# Patient Record
Sex: FEMALE | Race: WHITE | NOT HISPANIC OR LATINO | Employment: FULL TIME | URBAN - METROPOLITAN AREA
[De-identification: names, ages, dates, MRNs, and addresses within clinical notes are randomized per-mention and may not be internally consistent; named-entity substitution may affect disease eponyms.]

---

## 2018-01-12 NOTE — MISCELLANEOUS
Provider Comments  Provider Comments:   PT NO SHOW FOR APPT TODAY      Signatures   Electronically signed by : Ana Maria Belle, ; Feb 1 2016  4:46PM EST                       (Author)    Electronically signed by :  Dacia Williamson, ; Feb 2 2016  1:52PM EST                       (Author)    Electronically signed by : DREW Vazquez ; Feb 7 2016  5:44PM EST                       (Review)

## 2018-01-13 NOTE — MISCELLANEOUS
Message  Return to work or school:   Reid Hitchcock is under my professional care  She was seen in my office on 2/29/16   She is able to return to work on   3/1/16            Future Appointments    Signatures   Electronically signed by : Beth Stover DO; Feb 29 2016  2:17PM EST                       (Author)

## 2021-04-15 ENCOUNTER — HOSPITAL ENCOUNTER (EMERGENCY)
Facility: HOSPITAL | Age: 41
Discharge: HOME | End: 2021-04-15
Attending: EMERGENCY MEDICINE | Admitting: EMERGENCY MEDICINE
Payer: OTHER MISCELLANEOUS

## 2021-04-15 VITALS
HEIGHT: 67 IN | BODY MASS INDEX: 31.39 KG/M2 | RESPIRATION RATE: 17 BRPM | SYSTOLIC BLOOD PRESSURE: 138 MMHG | HEART RATE: 73 BPM | WEIGHT: 200 LBS | TEMPERATURE: 98.2 F | OXYGEN SATURATION: 99 % | DIASTOLIC BLOOD PRESSURE: 92 MMHG

## 2021-04-15 DIAGNOSIS — S49.91XA RIGHT SHOULDER INJURY: Primary | ICD-10-CM

## 2021-04-15 PROCEDURE — 99283 EMERGENCY DEPT VISIT LOW MDM: CPT

## 2021-04-15 PROCEDURE — 99284 EMERGENCY DEPT VISIT MOD MDM: CPT | Performed by: EMERGENCY MEDICINE

## 2021-04-15 RX ORDER — ACETAMINOPHEN 325 MG/1
975 TABLET ORAL ONCE
Status: COMPLETED | OUTPATIENT
Start: 2021-04-15 | End: 2021-04-15

## 2021-04-15 RX ORDER — LIDOCAINE 50 MG/G
1 PATCH TOPICAL ONCE
Status: DISCONTINUED | OUTPATIENT
Start: 2021-04-15 | End: 2021-04-15 | Stop reason: HOSPADM

## 2021-04-15 RX ADMIN — ACETAMINOPHEN 975 MG: 325 TABLET ORAL at 17:35

## 2021-04-15 RX ADMIN — LIDOCAINE 1 PATCH: 50 PATCH TOPICAL at 17:35

## 2021-04-15 NOTE — ED PROVIDER NOTES
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  Patient rolled away from her and caught her right arm underneath the patient  Patient had pain within the right shoulder located on the medial aspect of the right humerus  Pain is sharp  Worse with movement  Patient has full range of motion  Denies numbness, tingling, weakness  No other trauma to the shoulder  None       No past medical history on file  No past surgical history on file  No family history on file  I have reviewed and agree with the history as documented  E-Cigarette/Vaping    E-Cigarette Use Never User      E-Cigarette/Vaping Substances     Social History     Tobacco Use    Smoking status: Former Smoker    Smokeless tobacco: Never Used   Substance Use Topics    Alcohol use: Not Currently     Frequency: Never    Drug use: Not Currently        Review of Systems   Musculoskeletal: Positive for arthralgias and myalgias  All other systems reviewed and are negative  Physical Exam  ED Triage Vitals [04/15/21 1618]   Temperature Pulse Respirations Blood Pressure SpO2   98 2 °F (36 8 °C) 73 17 138/92 99 %      Temp Source Heart Rate Source Patient Position - Orthostatic VS BP Location FiO2 (%)   Oral Monitor Sitting Right arm --      Pain Score       7             Orthostatic Vital Signs  Vitals:    04/15/21 1618   BP: 138/92   Pulse: 73   Patient Position - Orthostatic VS: Sitting       Physical Exam  Vitals signs and nursing note reviewed  Constitutional:       General: She is not in acute distress  Appearance: Normal appearance  She is not ill-appearing  HENT:      Head: Normocephalic and atraumatic        Right Ear: External ear normal       Left Ear: External ear normal       Nose: Nose normal       Mouth/Throat:      Mouth: Mucous membranes are moist    Eyes:      General:         Right eye: No discharge  Left eye: No discharge  Conjunctiva/sclera: Conjunctivae normal    Neck:      Musculoskeletal: Normal range of motion  Cardiovascular:      Rate and Rhythm: Normal rate and regular rhythm  Pulses: Normal pulses  Heart sounds: No murmur  Pulmonary:      Effort: Pulmonary effort is normal       Breath sounds: Normal breath sounds  Abdominal:      General: Abdomen is flat  There is no distension  Tenderness: There is no abdominal tenderness  Musculoskeletal: Normal range of motion  General: Tenderness (Mild tenderness to palpation within the medial head of the humerus  On the right) present  Comments: Negative empty can test   Pain with supination of the right arm hole that approximately 90° of flexion 45° of external rotation  Pain primarily in the biceps tendon   Skin:     General: Skin is warm  Capillary Refill: Capillary refill takes less than 2 seconds  Findings: No rash  Neurological:      General: No focal deficit present  Mental Status: She is alert  Mental status is at baseline  Psychiatric:         Mood and Affect: Mood normal          Behavior: Behavior normal          ED Medications  Medications   lidocaine (LIDODERM) 5 % patch 1 patch (1 patch Topical Medication Applied 4/15/21 1735)   acetaminophen (TYLENOL) tablet 975 mg (975 mg Oral Given 4/15/21 1735)       Diagnostic Studies  Results Reviewed     None                 No orders to display         Procedures  Procedures      ED Course                                       MDM  Number of Diagnoses or Management Options  Diagnosis management comments: 39-year-old female presents with shoulder pain  Not fracture  Not a dislocation  Probable muscular injury versus tendinous injury  Will discharge home    Patient to follow-up with physical therapy and Occupational Medicine  Return precautions given  Risk of Complications, Morbidity, and/or Mortality  Presenting problems: minimal  Diagnostic procedures: minimal  Management options: minimal        Disposition  Final diagnoses:   Right shoulder injury     Time reflects when diagnosis was documented in both MDM as applicable and the Disposition within this note     Time User Action Codes Description Comment    4/15/2021  5:55 PM Davian Hobson Add [S49 91XA] Right shoulder injury       ED Disposition     ED Disposition Condition Date/Time Comment    Discharge Stable Thu Apr 15, 2021  5:55 PM Omayra Alfaro discharge to home/self care  Follow-up Information     Follow up With Specialties Details Why Contact Info Additional Information    [address] [facility]  Schedule an appointment as soon as possible for a visit  For re-evaluation as soon as possible [address] [facility]  [address] [facility] Orthopedic Surgery Schedule an appointment as soon as possible for a visit  if continued pain in 10 days [address] [id number]  [phone] [facility], [address] [place] [address], [place], [place], [address]    Physical Therapy at [address] [facility] Physical Therapy Schedule an appointment as soon as possible for a visit   [address]  [phone] Physical Therapy at [address] [place] [address], [place], [place], [address]          Patient's Medications    No medications on file     No discharge procedures on file  PDMP Review     None           ED Provider  Attending physically available and evaluated Omayra Dominic NUNEZ managed the patient along with the ED Attending      Electronically Signed by         Doyle Collet [id number] [facility], DO  04/15/21 175

## 2021-04-15 NOTE — Clinical Note
Jesus Romano was seen and treated in our emergency department on 4/15/2021  Other - See Comments    Light duty with right upper extremity until cleared by Occupational Medicine  Diagnosis: Right shoulder injury    Kelvin  may return to school on return date  She may return on this date: 04/16/2021         If you have any questions or concerns, please don't hesitate to call        Khurram Alejo, DO    ______________________________           _______________          _______________  Hospital Representative                              Date                                Time

## 2021-04-15 NOTE — DISCHARGE INSTRUCTIONS
Follow-up with occupational medicine for release back to full work  Until that point, limited use of right upper extremity  Follow-up with physical therapy  Follow-up with orthopedic surgery if continued pain in 10 days

## 2021-04-16 NOTE — ED ATTENDING ATTESTATION
4/15/2021  Anthony NUNEZ Mt, MD, saw and evaluated the patient  I have discussed the patient with the resident/non-physician practitioner and agree with the resident's/non-physician practitioner's findings, Plan of Care, and MDM as documented in the resident's/non-physician practitioner's note, except where noted  All available labs and Radiology studies were reviewed  I was present for key portions of any procedure(s) performed by the resident/non-physician practitioner and I was immediately available to provide assistance  At this point I agree with the current assessment done in the Emergency Department  I have conducted an independent evaluation of this patient a history and physical is as follows:    ED Course      66-year-old female presenting to the emergency department for evaluation pain in her right shoulder over past 24 hours  Patient had injured her arm while she was moving a patient while she was at work  Patient identifies the area of maximal pain along the anterior aspect of the shoulder  Patient states that the pain is made worse with flexion at the shoulder and external rotation  On examination shoulders unremarkable on external appearance  No tenderness with passive range of motion  With it flexion and external rotation, the patient has tenderness over the anterior deltoid and biceps tendon  Patient has tenderness over the biceps tendon with flexion at the elbow against resistance  Musculoskeletal strain      Critical Care Time  Procedures

## 2022-08-15 ENCOUNTER — OCCMED (OUTPATIENT)
Dept: URGENT CARE | Facility: CLINIC | Age: 42
End: 2022-08-15

## 2022-08-15 ENCOUNTER — APPOINTMENT (OUTPATIENT)
Dept: LAB | Facility: CLINIC | Age: 42
End: 2022-08-15

## 2022-08-15 DIAGNOSIS — Z00.00 ENCOUNTER FOR PHYSICAL EXAMINATION: Primary | ICD-10-CM

## 2022-08-15 DIAGNOSIS — Z00.00 ENCOUNTER FOR PHYSICAL EXAMINATION: ICD-10-CM

## 2022-08-15 LAB
MEV IGG SER QL IA: ABNORMAL
MUV IGG SER QL IA: NORMAL
RUBV IGG SERPL IA-ACNC: >175 IU/ML
VZV IGG SER QL IA: NORMAL

## 2022-08-15 PROCEDURE — 86735 MUMPS ANTIBODY: CPT

## 2022-08-15 PROCEDURE — 36415 COLL VENOUS BLD VENIPUNCTURE: CPT

## 2022-08-15 PROCEDURE — 86787 VARICELLA-ZOSTER ANTIBODY: CPT

## 2022-08-15 PROCEDURE — 86480 TB TEST CELL IMMUN MEASURE: CPT

## 2022-08-15 PROCEDURE — 86762 RUBELLA ANTIBODY: CPT

## 2022-08-15 PROCEDURE — 86765 RUBEOLA ANTIBODY: CPT

## 2022-08-17 LAB
GAMMA INTERFERON BACKGROUND BLD IA-ACNC: 0.03 IU/ML
M TB IFN-G BLD-IMP: NEGATIVE
M TB IFN-G CD4+ BCKGRND COR BLD-ACNC: 0 IU/ML
M TB IFN-G CD4+ BCKGRND COR BLD-ACNC: 0 IU/ML
MITOGEN IGNF BCKGRD COR BLD-ACNC: 9.34 IU/ML

## 2022-12-14 ENCOUNTER — OFFICE VISIT (OUTPATIENT)
Dept: INTERNAL MEDICINE CLINIC | Facility: CLINIC | Age: 42
End: 2022-12-14

## 2022-12-14 VITALS
OXYGEN SATURATION: 98 % | HEART RATE: 85 BPM | BODY MASS INDEX: 35.31 KG/M2 | DIASTOLIC BLOOD PRESSURE: 80 MMHG | RESPIRATION RATE: 15 BRPM | HEIGHT: 67 IN | WEIGHT: 225 LBS | SYSTOLIC BLOOD PRESSURE: 126 MMHG

## 2022-12-14 DIAGNOSIS — Z13.220 ENCOUNTER FOR LIPID SCREENING FOR CARDIOVASCULAR DISEASE: ICD-10-CM

## 2022-12-14 DIAGNOSIS — R63.5 ABNORMAL WEIGHT GAIN: ICD-10-CM

## 2022-12-14 DIAGNOSIS — Z13.6 ENCOUNTER FOR LIPID SCREENING FOR CARDIOVASCULAR DISEASE: ICD-10-CM

## 2022-12-14 DIAGNOSIS — R73.01 IMPAIRED FASTING GLUCOSE: ICD-10-CM

## 2022-12-14 DIAGNOSIS — E66.9 CLASS 2 OBESITY WITHOUT SERIOUS COMORBIDITY WITH BODY MASS INDEX (BMI) OF 35.0 TO 35.9 IN ADULT, UNSPECIFIED OBESITY TYPE: Primary | ICD-10-CM

## 2022-12-14 DIAGNOSIS — Z12.31 ENCOUNTER FOR SCREENING MAMMOGRAM FOR MALIGNANT NEOPLASM OF BREAST: ICD-10-CM

## 2022-12-14 PROBLEM — E66.812 CLASS 2 OBESITY WITHOUT SERIOUS COMORBIDITY WITH BODY MASS INDEX (BMI) OF 35.0 TO 35.9 IN ADULT: Status: ACTIVE | Noted: 2022-12-14

## 2022-12-14 RX ORDER — PHENTERMINE HYDROCHLORIDE 37.5 MG/1
37.5 CAPSULE ORAL
COMMUNITY
End: 2022-12-14

## 2022-12-14 NOTE — PATIENT INSTRUCTIONS
Fasting blood work  Consider saxenda for weight loss    Liraglutide (By injection)   Liraglutide ([id number]-tide)  Treats type 2 diabetes and helps with weight loss in certain patients  Also reduces the risk of heart attacks and strokes in patients with type 2 diabetes and heart or blood vessel disease  Brand Name(s): Saxenda, Victoza   There may be other brand names for this medicine  When This Medicine Should Not Be Used: This medicine is not right for everyone  Do not use it if you had an allergic reaction to liraglutide, or if you have type 1 diabetes or multiple endocrine neoplasia syndrome type 2 (MEN 2) or if you or anyone in your family had medullary thyroid cancer  Tell your doctor if you are pregnant or have become pregnant while you are using this medicine  How to Use This Medicine:   Injectable  Your doctor will prescribe your exact dose and tell you how often it should be given  This medicine is given as a shot under your skin  It is usually given in your stomach, thighs, or upper arms  If you use insulin in addition to Victoza®, do not mix them into the same syringe  You may give the shots in the same area (including your stomach), but do not give the shots right next to each other  You may be taught how to give your medicine at home  Make sure you understand all instructions before giving yourself an injection  Do not use more medicine or use it more often than your doctor tells you to  Check the liquid in the pen  It should be clear and colorless  Do not use it if it is cloudy, discolored, or has particles in it  You will be shown the body areas where this shot can be given  Use a different body area each time you give yourself a shot  Keep track of where you give each shot to make sure you rotate body areas  Use a new needle and syringe each time you inject your medicine  Never share medicine pens with others under any circumstances   Sharing needles or pens can result in transmission of infection  Drink extra fluids so you will urinate more often and help prevent kidney problems  This medicine should come with a Medication Guide  Ask your pharmacist for a copy if you do not have one  Missed dose: If you miss a dose of this medicine, skip the missed dose and go back to your regular dosing schedule  Never take extra medicine to make up for a missed dose  If you miss a dose for 3 days or more, call your doctor to talk about how to restart your treatment  Store your new, unused medicine pen in its original carton in the refrigerator  Protect it from light  Do not freeze this medicine or use it if it has been frozen  You may store the opened medicine pen in the refrigerator or at room temperature for 30 days  Throw away any unused medicine after 30 days, even if it still has medicine in it  Always remove the needle from the pen before you store it  Throw away used needles in a hard, closed container that the needles cannot poke through  Keep this container away from children and pets  Drugs and Foods to Avoid:   Ask your doctor or pharmacist before using any other medicine, including over-the-counter medicines, vitamins, and herbal products  Do not use Saxenda® together with insulin  Some medicines can affect how Kenny [employer]  works  Tell your doctor if you are using insulin or other diabetes medicine (including ? ?glibenclamide, glimepiride, glipizide) or any other oral medicine  Warnings While Using This Medicine:   Tell your doctor if you are breastfeeding, or if you have kidney disease, liver disease, digestion problems (including gastroparesis), gallbladder disease, or a history of pancreas problems, depression, or angioedema (swelling of the arms, face, hands, mouth, or throat)  Do not use Saxenda® if you are also using Victoza®  They contain the same medicine    This medicine may cause the following problems:   Increased risk for thyroid tumors  Pancreatitis (swelling of the pancreas)  Low blood sugar (when used together with insulin or other diabetes medicine)  Kidney problems  Gallbladder problems, including gallstones  Thoughts of hurting yourself Veronika Whitaker)  Your doctor will do lab tests at regular visits to check on the effects of this medicine  Keep all appointments  Keep all medicine out of the reach of children  Never share your medicine with anyone  Possible Side Effects While Using This Medicine:   Call your doctor right away if you notice any of these side effects: Allergic reaction: Itching or hives, swelling in your face or hands, swelling or tingling in your mouth or throat, chest tightness, trouble breathing  Change in how much or how often you urinate, painful or burning urination  Feeling sad or depressed, thoughts of suicide, unusual changes in mood or behavior  Shaking, trembling, sweating, fast or pounding heartbeat, fainting, hunger, confusion  Sudden and severe stomach pain, nausea, vomiting, fever, lightheadedness, yellow skin or eyes  Trouble breathing or swallowing, a lump in your neck, hoarseness when speaking  If you notice these less serious side effects, talk with your doctor:   Decreased appetite  Diarrhea, constipation, stomach upset  Dizziness  Headache  Redness, itching, swelling, or any changes in your skin where the shot was given  If you notice other side effects that you think are caused by this medicine, tell your doctor  Call your doctor for medical advice about side effects  You may report side effects to FDA at 3-429-FDA-5339    © Copyright [employer] 2022 Information is for End User's use only and may not be sold, redistributed or otherwise used for commercial purposes  The above information is an  only  It is not intended as medical advice for individual conditions or treatments  Talk to your doctor, nurse or pharmacist before following any medical regimen to see if it is safe and effective for you

## 2022-12-14 NOTE — PROGRESS NOTES
Name: Heath Dooley      : [id number]      MRN: [id number]  Encounter Provider: Gamaliel Angeles DO  Encounter Date: 2022   Encounter department: [place] [address]     1  Class 2 obesity without serious comorbidity with body mass index (BMI) of 35 0 to 35 9 in adult, unspecified obesity type  Comments:  diet and exercise counseling provided  t/c saxenda for weight loss, d/w patient AE of medication  check BW first(ordered)  Orders:  -     Comprehensive metabolic panel; Future  -     CBC and differential  -     TSH, 3rd generation with Free T4 reflex    2  Abnormal weight gain  Comments:  c/w plan as above, she denies feeling depressed but is upset about her excess weight  Orders:  -     Comprehensive metabolic panel; Future  -     CBC and differential  -     TSH, 3rd generation with Free T4 reflex    3  Encounter for screening mammogram for malignant neoplasm of breast  -     Mammo screening bilateral w 3d & cad; Future; Expected date: 2022    4  Encounter for lipid screening for cardiovascular disease  -     Lipid Panel with Direct LDL reflex; Future        Depression Screening and Follow-up Plan: Patient was screened for depression during today's encounter  They screened negative with a PHQ-2 score of 1  Subjective      HPI     New to practice, here to establish care  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  Takes no medications on a regular basis  Gained weight over the last couple of yrs, 40+ lbs and having a hard time trying to lose it  She is eating better and exercising  She went to a weight loss dr but did not tolerate the medications that were prescribed  She is UTD on flu vaccine, no BW in sometime  She has no fhx thyroid disease/cancer or pancreatitis/pancreatic cancer and is interested in saxenda  ROS otherwise negative, no other complaints  Review of Systems   Constitutional: Negative for fever  HENT: Negative for congestion  Eyes: Negative for visual disturbance  Respiratory: Negative for shortness of breath  Cardiovascular: Negative for chest pain  Gastrointestinal: Negative for abdominal pain  Endocrine: Negative for polyuria  Genitourinary: Negative for difficulty urinating  Musculoskeletal: Negative for arthralgias  Skin: Negative for rash  Allergic/Immunologic: Negative for immunocompromised state  Neurological: Negative for weakness  Psychiatric/Behavioral: Negative for dysphoric mood  Current Outpatient Medications on File Prior to Visit   Medication Sig   • [DISCONTINUED] phentermine 37 5 MG capsule Take 37 5 mg by mouth       Objective     /80   Pulse 85   Resp 15   Ht 5' 7" (1 702 m)   Wt 102 kg (225 lb)   SpO2 98%   BMI 35 24 kg/m²     Physical Exam  Vitals reviewed  Constitutional:       General: She is not in acute distress  Appearance: Normal appearance  She is obese  HENT:      Head: Normocephalic and atraumatic  Right Ear: Tympanic membrane normal       Left Ear: Tympanic membrane normal    Eyes:      Conjunctiva/sclera: Conjunctivae normal    Cardiovascular:      Rate and Rhythm: Normal rate and regular rhythm  Heart sounds: No murmur heard  Pulmonary:      Effort: Pulmonary effort is normal       Breath sounds: No wheezing or rales  Abdominal:      General: Bowel sounds are normal       Palpations: Abdomen is soft  Tenderness: There is no abdominal tenderness  Musculoskeletal:      Right lower leg: No edema  Left lower leg: No edema  Lymphadenopathy:      Cervical: No cervical adenopathy  Neurological:      Mental Status: She is alert  Mental status is at baseline     Psychiatric:         Mood and Affect: Mood normal          Behavior: Behavior normal        Beka Tenorio DO

## 2022-12-15 ENCOUNTER — TELEPHONE (OUTPATIENT)
Dept: INTERNAL MEDICINE CLINIC | Facility: CLINIC | Age: 42
End: 2022-12-15

## 2022-12-15 ENCOUNTER — APPOINTMENT (OUTPATIENT)
Dept: LAB | Age: 42
End: 2022-12-15

## 2022-12-15 ENCOUNTER — TELEMEDICINE (OUTPATIENT)
Dept: INTERNAL MEDICINE CLINIC | Facility: CLINIC | Age: 42
End: 2022-12-15

## 2022-12-15 DIAGNOSIS — D72.828 OTHER ELEVATED WHITE BLOOD CELL (WBC) COUNT: ICD-10-CM

## 2022-12-15 DIAGNOSIS — R73.09 ABNORMAL GLUCOSE: Primary | ICD-10-CM

## 2022-12-15 DIAGNOSIS — R73.01 IMPAIRED FASTING GLUCOSE: Primary | ICD-10-CM

## 2022-12-15 DIAGNOSIS — Z13.6 ENCOUNTER FOR LIPID SCREENING FOR CARDIOVASCULAR DISEASE: ICD-10-CM

## 2022-12-15 DIAGNOSIS — R63.5 ABNORMAL WEIGHT GAIN: ICD-10-CM

## 2022-12-15 DIAGNOSIS — E66.9 CLASS 2 OBESITY WITHOUT SERIOUS COMORBIDITY WITH BODY MASS INDEX (BMI) OF 35.0 TO 35.9 IN ADULT, UNSPECIFIED OBESITY TYPE: ICD-10-CM

## 2022-12-15 DIAGNOSIS — R79.89 ELEVATED TSH: ICD-10-CM

## 2022-12-15 DIAGNOSIS — Z13.220 ENCOUNTER FOR LIPID SCREENING FOR CARDIOVASCULAR DISEASE: ICD-10-CM

## 2022-12-15 LAB
ALBUMIN SERPL BCP-MCNC: 3.3 G/DL (ref 3.5–5)
ALP SERPL-CCNC: 98 U/L (ref 46–116)
ALT SERPL W P-5'-P-CCNC: 25 U/L (ref 12–78)
ANION GAP SERPL CALCULATED.3IONS-SCNC: 5 MMOL/L (ref 4–13)
AST SERPL W P-5'-P-CCNC: 17 U/L (ref 5–45)
BASOPHILS # BLD AUTO: 0.1 THOUSANDS/ÂΜL (ref 0–0.1)
BASOPHILS NFR BLD AUTO: 1 % (ref 0–1)
BILIRUB SERPL-MCNC: 0.32 MG/DL (ref 0.2–1)
BUN SERPL-MCNC: 13 MG/DL (ref 5–25)
CALCIUM ALBUM COR SERPL-MCNC: 9.2 MG/DL (ref 8.3–10.1)
CALCIUM SERPL-MCNC: 8.6 MG/DL (ref 8.3–10.1)
CHLORIDE SERPL-SCNC: 110 MMOL/L (ref 96–108)
CHOLEST SERPL-MCNC: 169 MG/DL
CO2 SERPL-SCNC: 23 MMOL/L (ref 21–32)
CREAT SERPL-MCNC: 0.72 MG/DL (ref 0.6–1.3)
EOSINOPHIL # BLD AUTO: 0.36 THOUSAND/ÂΜL (ref 0–0.61)
EOSINOPHIL NFR BLD AUTO: 3 % (ref 0–6)
ERYTHROCYTE [DISTWIDTH] IN BLOOD BY AUTOMATED COUNT: 14.8 % (ref 11.6–15.1)
EST. AVERAGE GLUCOSE BLD GHB EST-MCNC: 114 MG/DL
GFR SERPL CREATININE-BSD FRML MDRD: 102 ML/MIN/1.73SQ M
GLUCOSE P FAST SERPL-MCNC: 153 MG/DL (ref 65–99)
HBA1C MFR BLD: 5.6 %
HCT VFR BLD AUTO: 37.9 % (ref 34.8–46.1)
HDLC SERPL-MCNC: 45 MG/DL
HGB BLD-MCNC: 12 G/DL (ref 11.5–15.4)
IMM GRANULOCYTES # BLD AUTO: 0.05 THOUSAND/UL (ref 0–0.2)
IMM GRANULOCYTES NFR BLD AUTO: 0 % (ref 0–2)
LDLC SERPL CALC-MCNC: 93 MG/DL (ref 0–100)
LYMPHOCYTES # BLD AUTO: 3.22 THOUSANDS/ÂΜL (ref 0.6–4.47)
LYMPHOCYTES NFR BLD AUTO: 28 % (ref 14–44)
MCH RBC QN AUTO: 29.4 PG (ref 26.8–34.3)
MCHC RBC AUTO-ENTMCNC: 31.7 G/DL (ref 31.4–37.4)
MCV RBC AUTO: 93 FL (ref 82–98)
MONOCYTES # BLD AUTO: 0.75 THOUSAND/ÂΜL (ref 0.17–1.22)
MONOCYTES NFR BLD AUTO: 7 % (ref 4–12)
NEUTROPHILS # BLD AUTO: 7 THOUSANDS/ÂΜL (ref 1.85–7.62)
NEUTS SEG NFR BLD AUTO: 61 % (ref 43–75)
NRBC BLD AUTO-RTO: 0 /100 WBCS
PLATELET # BLD AUTO: 355 THOUSANDS/UL (ref 149–390)
PMV BLD AUTO: 10 FL (ref 8.9–12.7)
POTASSIUM SERPL-SCNC: 4 MMOL/L (ref 3.5–5.3)
PROT SERPL-MCNC: 7.4 G/DL (ref 6.4–8.4)
RBC # BLD AUTO: 4.08 MILLION/UL (ref 3.81–5.12)
SODIUM SERPL-SCNC: 138 MMOL/L (ref 135–147)
T4 FREE SERPL-MCNC: 0.8 NG/DL (ref 0.76–1.46)
TRIGL SERPL-MCNC: 155 MG/DL
TSH SERPL DL<=0.05 MIU/L-ACNC: 4.54 UIU/ML (ref 0.45–4.5)
WBC # BLD AUTO: 11.48 THOUSAND/UL (ref 4.31–10.16)

## 2022-12-15 NOTE — PROGRESS NOTES
Virtual Brief Visit    Patient is located in the following state in which I hold an active license [place]      Assessment/Plan:    Problem List Items Addressed This Visit    None  Visit Diagnoses     Abnormal glucose    -  Primary    Elevated TSH        Relevant Orders    TSH, 3rd generation with Free T4 reflex    Other elevated white blood cell (WBC) count            Disposition:    -Abnormal glucose level -> not fasting contrary to lab comment  A1C added and in process  F/u after A1C results  If pre-DM or DM -> will refer to DM educator  C/w healthy diet and regular exercise    -elevated TSH -> related to recent URI? Check again in 6-8 weeks    -Leukocytosis -> likely from recent URI  CBC normal otherwise      Recent Visits  Date Type Provider Dept   12/14/22 Office Visit Delmy Kothari, [facility]   Showing recent visits within past 7 days and meeting all other requirements  Today's Visits  Date Type Provider Dept   12/15/22 Telemedicine Delmy Kothari, [facility]   12/15/22 Telephone [address] Internal Med   Showing today's visits and meeting all other requirements  Future Appointments  No visits were found meeting these conditions  Showing future appointments within next 150 days and meeting all other requirements     HPI    Shayla saw her lab results via [facility] [address] and was concerned, called the office and set up for a virtual visit to review the results  She has a fhx of DM and had a large meal last night around midnight, so was Not fasting when had BW this morning at 7am   She had a recent viral URI and has had off/on allergies lately as well  She is feeling better now  She read about saxenda and is interested in taking this rx  We discussed possibility of DM or pre-DM as a cause of elevated BS on blood work  ROS Otherwise negative, no other complaints      Review of Systems   Constitutional: Positive for unexpected weight change (weight gain over last 2 years)  HENT: Negative for congestion  Respiratory: Negative for cough  Allergic/Immunologic: Positive for environmental allergies  Negative for immunocompromised state         Visit Time    Visit Start Time: 1:30pm  Visit Stop Time: 1:40pm  Total Visit Duration: 10 minutes

## 2022-12-15 NOTE — TELEPHONE ENCOUNTER
Just got them back, her blood sugar is high and there are some other abnormal results    Can you put her on the schedule for a virtual visit now(1:30pm) so I can review her results with her in detail?     Thank you

## 2023-06-07 ENCOUNTER — OFFICE VISIT (OUTPATIENT)
Dept: INTERNAL MEDICINE CLINIC | Facility: CLINIC | Age: 43
End: 2023-06-07
Payer: COMMERCIAL

## 2023-06-07 VITALS
TEMPERATURE: 99.3 F | RESPIRATION RATE: 16 BRPM | OXYGEN SATURATION: 98 % | DIASTOLIC BLOOD PRESSURE: 80 MMHG | BODY MASS INDEX: 35.44 KG/M2 | WEIGHT: 225.8 LBS | HEART RATE: 102 BPM | SYSTOLIC BLOOD PRESSURE: 128 MMHG | HEIGHT: 67 IN

## 2023-06-07 DIAGNOSIS — E66.9 CLASS 2 OBESITY WITHOUT SERIOUS COMORBIDITY WITH BODY MASS INDEX (BMI) OF 35.0 TO 35.9 IN ADULT, UNSPECIFIED OBESITY TYPE: ICD-10-CM

## 2023-06-07 DIAGNOSIS — M79.622 AXILLARY TENDERNESS, LEFT: ICD-10-CM

## 2023-06-07 DIAGNOSIS — F41.9 ANXIETY: ICD-10-CM

## 2023-06-07 DIAGNOSIS — M79.89 LEFT AXILLARY SWELLING: Primary | ICD-10-CM

## 2023-06-07 PROCEDURE — 99213 OFFICE O/P EST LOW 20 MIN: CPT | Performed by: INTERNAL MEDICINE

## 2023-06-07 NOTE — PROGRESS NOTES
Name: Brian Ortez      :       MRN: [id number]  Encounter Provider: Shasha Reardon DO  Encounter Date: 2023   Encounter department: [place] [address]     1  Left axillary swelling  Comments:  concern for enlarged LN  she is due for mammogram as well   diag mammo and US Ordered, she is aware there may be a copay for this test  Orders:  -     Mammo diagnostic bilateral w 3d & cad; Future  -     US Breast Axilla Left; Future; Expected date: 2023    2  Axillary tenderness, left  -     Mammo diagnostic bilateral w 3d & cad; Future  -     US Breast Axilla Left; Future; Expected date: 2023    3  Anxiety  Comments:  ongoing for 1 month, since a stressful and partly traumatic experience at work  she declines rx, therapy and other treatments  counseling provided today    4  Class 2 obesity without serious comorbidity with body mass index (BMI) of 35 0 to 35 9 in adult, unspecified obesity type  Comments:  she did not tolerate saxenda, caused too much upper GI adverse effects  BMI Counseling: Body mass index is 35 37 kg/m²  The BMI is above normal  Exercise recommendations include exercising 3-5 times per week  Rationale for BMI follow-up plan is due to patient being overweight or obese  Depression Screening and Follow-up Plan: Patient was screened for depression during today's encounter  They screened negative with a PHQ-2 score of 2  Subjective      HPI     Here for follow up and a few concerns  She noticed a tender lump in her left axilla since Friday of last week  She shaved her armpits that day  She has no drainage but the area is tender  No enlarged glands elsewhere  No fever, chills but she is having more anxiety lately  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 had a difficult experience when a Trauma patient passed away  She has occ trouble sleeping  JUSTIN-7 score of 16    She denies feeling depressed or SI   ROS "otherwise negative, no other complaints  Review of Systems   Constitutional: Negative for fever  HENT: Negative for congestion  Eyes: Negative for visual disturbance  Respiratory: Negative for shortness of breath  Cardiovascular: Negative for chest pain  Gastrointestinal: Negative for abdominal pain  Endocrine: Negative for polyuria  Genitourinary: Negative for difficulty urinating  Musculoskeletal: Negative for arthralgias  Skin: Negative for rash  Allergic/Immunologic: Negative for immunocompromised state  Neurological: Negative for dizziness  Hematological: Positive for adenopathy  Psychiatric/Behavioral: Negative for dysphoric mood  The patient is nervous/anxious  Current Outpatient Medications on File Prior to Visit   Medication Sig   • [DISCONTINUED] Insulin Pen Needle (BD Pen Needle Frances U/F) 32G X 4 MM MISC Use in the morning With saxenda pen   • [DISCONTINUED] liraglutide (SAXENDA) injection Administer 0 6 mg subQ once daily; increase weekly in increments of 0 6 mg/day until maintenance dose of 3 mg once daily is reached       Objective     /80 (BP Location: Left arm, Patient Position: Sitting, Cuff Size: Large)   Pulse 102   Temp 99 3 °F (37 4 °C) (Tympanic)   Resp 16   Ht 5' 7\" (1 702 m)   Wt 102 kg (225 lb 12 8 oz)   SpO2 98%   BMI 35 37 kg/m²     Physical Exam  Vitals reviewed  Constitutional:       General: She is not in acute distress  HENT:      Head: Normocephalic and atraumatic  Right Ear: Tympanic membrane normal       Left Ear: Tympanic membrane normal    Eyes:      Conjunctiva/sclera: Conjunctivae normal    Cardiovascular:      Rate and Rhythm: Normal rate and regular rhythm  Heart sounds: No murmur heard  Pulmonary:      Effort: Pulmonary effort is normal       Breath sounds: No wheezing or rales     Chest:      Comments: Patient deferred exam(will be seeing her Gyn)  Abdominal:      General: Bowel sounds are normal       " Palpations: Abdomen is soft  Tenderness: There is no abdominal tenderness  Musculoskeletal:      Cervical back: Neck supple  Right lower leg: No edema  Left lower leg: No edema  Lymphadenopathy:      Upper Body:      Right upper body: No axillary adenopathy  Left upper body: Axillary adenopathy (1 tender LN in anterior left axilla with overlying skin erythema  no drainage or induration) present  Neurological:      Mental Status: She is alert  Mental status is at baseline     Psychiatric:         Mood and Affect: Mood normal          Behavior: Behavior normal        Beka Tenorio DO

## 2023-06-07 NOTE — PATIENT INSTRUCTIONS
Ultrasound and mammogram  Consider rozerem or trazodone to help with sleep  Consider meeting with a therapist, we have someone at our office  Return in 2 months or sooner if questions  See you OB/Gyn for eval and breast exam

## 2023-06-08 ENCOUNTER — APPOINTMENT (OUTPATIENT)
Dept: LAB | Facility: CLINIC | Age: 43
End: 2023-06-08
Payer: COMMERCIAL

## 2023-06-08 DIAGNOSIS — R79.89 ELEVATED TSH: ICD-10-CM

## 2023-06-08 LAB — TSH SERPL DL<=0.05 MIU/L-ACNC: 2.89 UIU/ML (ref 0.45–4.5)

## 2023-06-08 PROCEDURE — 36415 COLL VENOUS BLD VENIPUNCTURE: CPT

## 2023-06-08 PROCEDURE — 84443 ASSAY THYROID STIM HORMONE: CPT

## 2024-03-21 ENCOUNTER — OFFICE VISIT (OUTPATIENT)
Dept: INTERNAL MEDICINE CLINIC | Facility: CLINIC | Age: 44
End: 2024-03-21
Payer: COMMERCIAL

## 2024-03-21 VITALS
OXYGEN SATURATION: 99 % | HEART RATE: 87 BPM | TEMPERATURE: 98 F | DIASTOLIC BLOOD PRESSURE: 82 MMHG | SYSTOLIC BLOOD PRESSURE: 122 MMHG | BODY MASS INDEX: 35.4 KG/M2 | WEIGHT: 226 LBS

## 2024-03-21 DIAGNOSIS — F41.8 DEPRESSION WITH ANXIETY: ICD-10-CM

## 2024-03-21 DIAGNOSIS — Z12.31 ENCOUNTER FOR SCREENING MAMMOGRAM FOR MALIGNANT NEOPLASM OF BREAST: ICD-10-CM

## 2024-03-21 DIAGNOSIS — Z78.9 PARTICIPANT IN HEALTH AND WELLNESS PLAN: ICD-10-CM

## 2024-03-21 DIAGNOSIS — E66.9 CLASS 2 OBESITY WITHOUT SERIOUS COMORBIDITY WITH BODY MASS INDEX (BMI) OF 35.0 TO 35.9 IN ADULT, UNSPECIFIED OBESITY TYPE: ICD-10-CM

## 2024-03-21 DIAGNOSIS — Z12.11 SCREEN FOR COLON CANCER: ICD-10-CM

## 2024-03-21 DIAGNOSIS — Z00.00 WELLNESS EXAMINATION: Primary | ICD-10-CM

## 2024-03-21 DIAGNOSIS — R79.89 ELEVATED TSH: ICD-10-CM

## 2024-03-21 PROCEDURE — 99213 OFFICE O/P EST LOW 20 MIN: CPT | Performed by: INTERNAL MEDICINE

## 2024-03-21 PROCEDURE — 99396 PREV VISIT EST AGE 40-64: CPT | Performed by: INTERNAL MEDICINE

## 2024-03-21 RX ORDER — SEMAGLUTIDE 0.25 MG/.5ML
0.25 INJECTION, SOLUTION SUBCUTANEOUS WEEKLY
Qty: 2 ML | Refills: 0 | Status: SHIPPED | OUTPATIENT
Start: 2024-03-21

## 2024-03-21 RX ORDER — DULOXETIN HYDROCHLORIDE 20 MG/1
20 CAPSULE, DELAYED RELEASE ORAL
Qty: 30 CAPSULE | Refills: 1 | Status: SHIPPED | OUTPATIENT
Start: 2024-03-21

## 2024-03-21 NOTE — PROGRESS NOTES
ADULT ANNUAL PHYSICAL  [facility] INTERNAL MEDICINE    NAME: Shayla Casanova  AGE: 43 y.o. SEX: female  : 1980     DATE: 3/21/2024     Assessment and Plan:     Problem List Items Addressed This Visit       Class 2 obesity without serious comorbidity with body mass index (BMI) of 35.0 to 35.9 in adult    Relevant Medications    Semaglutide-Weight Management (Wegovy) 0.25 MG/0.5ML    Depression with anxiety    Relevant Medications    Semaglutide-Weight Management (Wegovy) 0.25 MG/0.5ML    DULoxetine (CYMBALTA) 20 mg capsule    Other Relevant Orders    Comprehensive metabolic panel    CBC and differential     Other Visit Diagnoses       Wellness examination    -  Primary    Elevated TSH        last TFTs WNL, check again with next BW    Relevant Orders    TSH, 3rd generation with Free T4 reflex    Encounter for screening mammogram for malignant neoplasm of breast        Relevant Orders    Mammo screening bilateral w 3d & cad    Screen for colon cancer        she prefers cologuard at this time and is aware that if cologuard is postive -> she must do colonoscopy    Relevant Orders    Cologuard    Participant in health and wellness plan        Relevant Orders    Hemoglobin A1C    Lipid Panel with Direct LDL reflex            Immunizations and preventive care screenings were discussed with patient today. Appropriate education was printed on patient's after visit summary.    Counseling:  Dental Health: discussed importance of regular tooth brushing, flossing, and dental visits.  Exercise: the importance of regular exercise/physical activity was discussed. Recommend exercise 3-5 times per week for at least 30 minutes.          Return in about 1 month (around 2024), or if symptoms worsen or fail to improve.     Chief Complaint:     No chief complaint on file.     History of Present Illness:     Adult Annual Physical   Patient here for a comprehensive physical  exam. The patient reports problems - Shayla has several concerns today besides her annual physical.  She has been feeling depressed and anxious lately for multiple reasons.  She denies SI but has low energy.  She has not taken medication for depression in past.  She is not exercising and is concerned about her excess weight.  She reports eating well recently and losing a few lbs.  She tried saxenda but it caused intolerable GI side effects.  She would like to try this again but wegovy this time.  She has no personal or fhx pancreas problems or thyroid cancer.  She is due for mammogram, pap smear, colon cancer screening as well.  She is working at [employer] in [employer] dept.  ROS otherwise negative, no other complaints.    Diet and Physical Activity  Diet/Nutrition: well balanced diet.   Exercise: no formal exercise.      Depression Screening  PHQ-2/9 Depression Screening    Little interest or pleasure in doing things: 3 - nearly every day  Feeling down, depressed, or hopeless: 1 - several days  Trouble falling or staying asleep, or sleeping too much: 3 - nearly every day  Feeling tired or having little energy: 3 - nearly every day  Poor appetite or overeating: 3 - nearly every day  Feeling bad about yourself - or that you are a failure or have let yourself or your family down: 2 - more than half the days  Trouble concentrating on things, such as reading the newspaper or watching television: 3 - nearly every day  Moving or speaking so slowly that other people could have noticed. Or the opposite - being so fidgety or restless that you have been moving around a lot more than usual: 0 - not at all  Thoughts that you would be better off dead, or of hurting yourself in some way: 0 - not at all  PHQ-2 Score: 4  PHQ-2 Interpretation: POSITIVE depression screen  PHQ-9 Score: 18  PHQ-9 Interpretation: Moderately severe depression       General Health  Sleep: sleeps well.   Hearing: normal - bilateral.  Vision: no vision problems.    Dental:  due to dental visits .       /GYN Health  Follows with gynecology? yes   Patient is: premenopausal     Review of Systems:     Review of Systems   Constitutional:  Negative for fever.   HENT:  Negative for congestion.    Eyes:  Negative for visual disturbance.   Respiratory:  Negative for shortness of breath.    Cardiovascular:  Negative for chest pain.   Gastrointestinal:  Negative for abdominal pain.   Endocrine: Negative for polyuria.   Genitourinary:  Negative for difficulty urinating.   Musculoskeletal:  Negative for gait problem.   Skin:  Negative for rash.   Allergic/Immunologic: Negative for immunocompromised state.   Neurological:  Negative for dizziness.   Psychiatric/Behavioral:  Positive for dysphoric mood. Negative for sleep disturbance and suicidal ideas. The patient is nervous/anxious.       Past Medical History:     Past Medical History:   Diagnosis Date    Headache(784.0)     Just on and off specially after i eat    Obesity       Past Surgical History:     Past Surgical History:   Procedure Laterality Date     SECTION  3/18/2008    CHOLECYSTECTOMY  2009    TUBAL LIGATION  2022      Social History:     Social History     Socioeconomic History    Marital status:      Spouse name: None    Number of children: None    Years of education: None    Highest education level: None   Occupational History    None   Tobacco Use    Smoking status: Former     Current packs/day: 0.00     Average packs/day: 0.3 packs/day for 5.0 years (1.3 ttl pk-yrs)     Types: Cigarettes     Start date: 2011     Quit date: 2016     Years since quittin.3    Smokeless tobacco: Never   Vaping Use    Vaping status: Never Used   Substance and Sexual Activity    Alcohol use: Yes     Alcohol/week: 1.0 standard drink of alcohol     Types: 1 Glasses of wine per week    Drug use: Never    Sexual activity: Yes     Partners: Male     Birth control/protection: Female Sterilization   Other Topics  Concern    None   Social History Narrative    None     Social Determinants of Health     Financial Resource Strain: Not on file   Food Insecurity: Not on file   Transportation Needs: Not on file   Physical Activity: Not on file   Stress: Not on file   Social Connections: Not on file   Intimate Partner Violence: Not on file   Housing Stability: Not on file      Family History:     Family History   Problem Relation Age of Onset    Diabetes Mother     Hypertension Mother     Heart attack Father 60    Cancer Father         bone cancer?    Hyperlipidemia Maternal Grandmother     Pancreatitis Neg Hx     Thyroid cancer Neg Hx       Current Medications:     Current Outpatient Medications   Medication Sig Dispense Refill    DULoxetine (CYMBALTA) 20 mg capsule Take 1 capsule (20 mg total) by mouth daily at bedtime 30 capsule 1    Semaglutide-Weight Management (Wegovy) 0.25 MG/0.5ML Inject 0.5 mL (0.25 mg total) under the skin once a week For 4 weeks then increase dose to 0.5mg once a week 2 mL 0     No current facility-administered medications for this visit.      Allergies:     Allergies   Allergen Reactions    Other Edema and Hives     Reaction Date: 27Feb2016;     Prednisone Hives    Wellbutrin [Bupropion] Other (See Comments)     Caused mood changes      Physical Exam:     /82 (BP Location: Left arm, Patient Position: Sitting, Cuff Size: Standard)   Pulse 87   Temp 98 °F (36.7 °C)   Wt 103 kg (226 lb)   SpO2 99%   BMI 35.40 kg/m²     Physical Exam  Vitals reviewed.   Constitutional:       General: She is not in acute distress.     Appearance: Normal appearance. She is obese.   HENT:      Head: Normocephalic and atraumatic.      Right Ear: Tympanic membrane normal.      Left Ear: Tympanic membrane normal.   Eyes:      Conjunctiva/sclera: Conjunctivae normal.   Cardiovascular:      Rate and Rhythm: Normal rate and regular rhythm.      Heart sounds: No murmur heard.  Pulmonary:      Effort: Pulmonary effort is  normal.      Breath sounds: No wheezing or rales.   Abdominal:      General: Abdomen is flat. Bowel sounds are normal.      Palpations: Abdomen is soft.      Tenderness: There is no abdominal tenderness.   Musculoskeletal:      Right lower leg: No edema.      Left lower leg: No edema.   Lymphadenopathy:      Cervical: No cervical adenopathy.   Neurological:      Mental Status: She is alert. Mental status is at baseline.   Psychiatric:         Mood and Affect: Mood normal.         Behavior: Behavior normal.          Beka Tenorio DO  [facility] INTERNAL MEDICINE

## 2024-03-21 NOTE — PATIENT INSTRUCTIONS
Blood work  Duloxetine once a day at bedtime  Mammogram  Cologuard  Gyn appointment  Return in 1 month or sooner if questions

## 2024-03-21 NOTE — LETTER
March 21, 2024     Patient: Shayla Casanova  YOB: 1980  Date of Visit: 3/21/2024      To Whom it May Concern:    Shayla Casanova is under my professional care. Shayla was seen in my office on 3/21/2024.  Please excuse Shayla from work from March 20, 2024 thru March 21, 2024.  She may return to work on March 22, 2024.    If you have any questions or concerns, please don't hesitate to call.         Sincerely,          Beka Tenorio, DO

## 2024-03-22 ENCOUNTER — APPOINTMENT (OUTPATIENT)
Dept: LAB | Facility: CLINIC | Age: 44
End: 2024-03-22
Payer: COMMERCIAL

## 2024-03-22 DIAGNOSIS — Z78.9 PARTICIPANT IN HEALTH AND WELLNESS PLAN: ICD-10-CM

## 2024-03-22 DIAGNOSIS — F41.8 DEPRESSION WITH ANXIETY: ICD-10-CM

## 2024-03-22 PROBLEM — R73.03 PRE-DIABETES: Status: ACTIVE | Noted: 2024-03-22

## 2024-03-22 LAB
ALBUMIN SERPL BCP-MCNC: 3.9 G/DL (ref 3.5–5)
ALP SERPL-CCNC: 73 U/L (ref 34–104)
ALT SERPL W P-5'-P-CCNC: 20 U/L (ref 7–52)
ANION GAP SERPL CALCULATED.3IONS-SCNC: 8 MMOL/L (ref 4–13)
AST SERPL W P-5'-P-CCNC: 18 U/L (ref 13–39)
BASOPHILS # BLD AUTO: 0.11 THOUSANDS/ÂΜL (ref 0–0.1)
BASOPHILS NFR BLD AUTO: 1 % (ref 0–1)
BILIRUB SERPL-MCNC: 0.28 MG/DL (ref 0.2–1)
BUN SERPL-MCNC: 14 MG/DL (ref 5–25)
CALCIUM SERPL-MCNC: 8.6 MG/DL (ref 8.4–10.2)
CHLORIDE SERPL-SCNC: 103 MMOL/L (ref 96–108)
CHOLEST SERPL-MCNC: 160 MG/DL
CO2 SERPL-SCNC: 24 MMOL/L (ref 21–32)
CREAT SERPL-MCNC: 0.68 MG/DL (ref 0.6–1.3)
EOSINOPHIL # BLD AUTO: 0.3 THOUSAND/ÂΜL (ref 0–0.61)
EOSINOPHIL NFR BLD AUTO: 3 % (ref 0–6)
ERYTHROCYTE [DISTWIDTH] IN BLOOD BY AUTOMATED COUNT: 13.9 % (ref 11.6–15.1)
EST. AVERAGE GLUCOSE BLD GHB EST-MCNC: 117 MG/DL
GFR SERPL CREATININE-BSD FRML MDRD: 105 ML/MIN/1.73SQ M
GLUCOSE P FAST SERPL-MCNC: 108 MG/DL (ref 65–99)
HBA1C MFR BLD: 5.7 %
HCT VFR BLD AUTO: 39.9 % (ref 34.8–46.1)
HDLC SERPL-MCNC: 43 MG/DL
HGB BLD-MCNC: 12.8 G/DL (ref 11.5–15.4)
IMM GRANULOCYTES # BLD AUTO: 0.05 THOUSAND/UL (ref 0–0.2)
IMM GRANULOCYTES NFR BLD AUTO: 0 % (ref 0–2)
LDLC SERPL CALC-MCNC: 87 MG/DL (ref 0–100)
LYMPHOCYTES # BLD AUTO: 3.56 THOUSANDS/ÂΜL (ref 0.6–4.47)
LYMPHOCYTES NFR BLD AUTO: 32 % (ref 14–44)
MCH RBC QN AUTO: 30.7 PG (ref 26.8–34.3)
MCHC RBC AUTO-ENTMCNC: 32.1 G/DL (ref 31.4–37.4)
MCV RBC AUTO: 96 FL (ref 82–98)
MONOCYTES # BLD AUTO: 0.67 THOUSAND/ÂΜL (ref 0.17–1.22)
MONOCYTES NFR BLD AUTO: 6 % (ref 4–12)
NEUTROPHILS # BLD AUTO: 6.48 THOUSANDS/ÂΜL (ref 1.85–7.62)
NEUTS SEG NFR BLD AUTO: 58 % (ref 43–75)
NRBC BLD AUTO-RTO: 0 /100 WBCS
PLATELET # BLD AUTO: 371 THOUSANDS/UL (ref 149–390)
PMV BLD AUTO: 9.9 FL (ref 8.9–12.7)
POTASSIUM SERPL-SCNC: 4 MMOL/L (ref 3.5–5.3)
PROT SERPL-MCNC: 6.8 G/DL (ref 6.4–8.4)
RBC # BLD AUTO: 4.17 MILLION/UL (ref 3.81–5.12)
SODIUM SERPL-SCNC: 135 MMOL/L (ref 135–147)
T4 FREE SERPL-MCNC: 0.63 NG/DL (ref 0.61–1.12)
TRIGL SERPL-MCNC: 150 MG/DL
TSH SERPL DL<=0.05 MIU/L-ACNC: 4.92 UIU/ML (ref 0.45–4.5)
WBC # BLD AUTO: 11.17 THOUSAND/UL (ref 4.31–10.16)

## 2024-03-22 PROCEDURE — 85025 COMPLETE CBC W/AUTO DIFF WBC: CPT | Performed by: INTERNAL MEDICINE

## 2024-03-22 PROCEDURE — 83036 HEMOGLOBIN GLYCOSYLATED A1C: CPT | Performed by: INTERNAL MEDICINE

## 2024-03-22 PROCEDURE — 84439 ASSAY OF FREE THYROXINE: CPT | Performed by: INTERNAL MEDICINE

## 2024-03-22 PROCEDURE — 84443 ASSAY THYROID STIM HORMONE: CPT | Performed by: INTERNAL MEDICINE

## 2024-03-22 PROCEDURE — 36415 COLL VENOUS BLD VENIPUNCTURE: CPT

## 2024-03-22 PROCEDURE — 80053 COMPREHEN METABOLIC PANEL: CPT

## 2024-03-22 PROCEDURE — 80061 LIPID PANEL: CPT

## 2024-04-11 DIAGNOSIS — R79.89 ELEVATED TSH: Primary | ICD-10-CM

## 2024-04-15 ENCOUNTER — APPOINTMENT (OUTPATIENT)
Dept: LAB | Facility: CLINIC | Age: 44
End: 2024-04-15
Payer: COMMERCIAL

## 2024-04-15 DIAGNOSIS — R79.89 ELEVATED TSH: ICD-10-CM

## 2024-04-15 LAB — TSH SERPL DL<=0.05 MIU/L-ACNC: 2.6 UIU/ML (ref 0.45–4.5)

## 2024-04-15 PROCEDURE — 84443 ASSAY THYROID STIM HORMONE: CPT

## 2024-04-15 PROCEDURE — 36415 COLL VENOUS BLD VENIPUNCTURE: CPT

## 2024-04-15 PROCEDURE — 86376 MICROSOMAL ANTIBODY EACH: CPT

## 2024-04-16 LAB — THYROPEROXIDASE AB SERPL-ACNC: 166 IU/ML (ref 0–34)

## 2024-04-23 LAB — COLOGUARD RESULT REPORTABLE: NEGATIVE

## 2024-04-24 ENCOUNTER — OFFICE VISIT (OUTPATIENT)
Dept: INTERNAL MEDICINE CLINIC | Facility: CLINIC | Age: 44
End: 2024-04-24
Payer: COMMERCIAL

## 2024-04-24 VITALS
HEART RATE: 82 BPM | DIASTOLIC BLOOD PRESSURE: 84 MMHG | OXYGEN SATURATION: 98 % | SYSTOLIC BLOOD PRESSURE: 120 MMHG | TEMPERATURE: 97.5 F

## 2024-04-24 DIAGNOSIS — F41.8 DEPRESSION WITH ANXIETY: Primary | ICD-10-CM

## 2024-04-24 DIAGNOSIS — E66.9 CLASS 2 OBESITY WITHOUT SERIOUS COMORBIDITY WITH BODY MASS INDEX (BMI) OF 35.0 TO 35.9 IN ADULT, UNSPECIFIED OBESITY TYPE: ICD-10-CM

## 2024-04-24 DIAGNOSIS — R73.03 PRE-DIABETES: ICD-10-CM

## 2024-04-24 PROCEDURE — 99214 OFFICE O/P EST MOD 30 MIN: CPT | Performed by: INTERNAL MEDICINE

## 2024-04-24 NOTE — PROGRESS NOTES
Name: Shayla Casanova      : 1980      MRN: [id number]  Encounter Provider: Beka Tenorio DO  Encounter Date: 2024   Encounter department: [facility] INTERNAL MEDICINE    Assessment & Plan     1. Depression with anxiety  Comments:  taper off duloxetine for now.  she feels better not taking this medication.  she declines SSRI due to side effects and therapy    2. Pre-diabetes  Comments:  c/w lower carb intake from diet and advised to start exercising regularly    3. Class 2 obesity without serious comorbidity with body mass index (BMI) of 35.0 to 35.9 in adult, unspecified obesity type  Comments:  when she is off of duloxetine, start Qsymia at low dose fo 1 month.  if tolerating, increase dose at month #2  Orders:  -     Phentermine-Topiramate 3.75-23 MG CP24; Take 1 tablet by mouth in the morning For 1 month, then increase dose to 7.5-46 mg once a day        Depression Screening and Follow-up Plan: Patient's depression screening was positive with a PHQ-9 score of 11. Patient assessed for underlying major depression. Brief counseling provided and recommend additional follow-up/re-evaluation next office visit.       I have spent a total time of 25 minutes on 24 in caring for this patient including Risks and benefits of tx options, Instructions for management, Patient and family education, Impressions, Counseling / Coordination of care, Documenting in the medical record, Reviewing / ordering tests, medicine, procedures  , and Obtaining or reviewing history  .    Subjective      HPI    Here for follow up, Shayla does not want to take wegovy.  She has friends that had it prescribed and they cannot find it at any pharmacy.  She took qsymia in past and would like to try this again.  She has no hx of kidney stones and had no AE to qsymia.  She is also not tolerating duloxetine.  She has low libido and feels tired and drugged per patient.  It does help some of her symptoms but she does  not feel the benefits outweigh the risks.  She would rather avoid any rx that has the possibility of weight gain.  ROS Otherwise negative, no other complaints.    Review of Systems   Constitutional:  Negative for fever.   HENT:  Negative for congestion.    Eyes:  Negative for visual disturbance.   Respiratory:  Negative for shortness of breath.    Cardiovascular:  Negative for chest pain and palpitations.   Gastrointestinal:  Negative for abdominal pain.   Endocrine: Negative for polyuria.   Genitourinary:  Negative for difficulty urinating.   Musculoskeletal:  Negative for gait problem.   Skin:  Negative for rash.   Allergic/Immunologic: Negative for immunocompromised state.   Neurological:  Negative for dizziness.   Psychiatric/Behavioral:  Positive for dysphoric mood. Negative for suicidal ideas.        Current Outpatient Medications on File Prior to Visit   Medication Sig    DULoxetine (CYMBALTA) 20 mg capsule Take 1 capsule (20 mg total) by mouth daily at bedtime    [DISCONTINUED] Semaglutide-Weight Management (Wegovy) 0.25 MG/0.5ML Inject 0.5 mL (0.25 mg total) under the skin once a week For 4 weeks then increase dose to 0.5mg once a week       Objective     /84   Pulse 82   Temp 97.5 °F (36.4 °C)   SpO2 98%     Physical Exam  Vitals reviewed.   Constitutional:       General: She is not in acute distress.     Appearance: She is obese.   HENT:      Head: Normocephalic and atraumatic.      Right Ear: Tympanic membrane normal.      Left Ear: Tympanic membrane normal.   Eyes:      Conjunctiva/sclera: Conjunctivae normal.   Cardiovascular:      Rate and Rhythm: Normal rate and regular rhythm.      Heart sounds: No murmur heard.  Pulmonary:      Effort: Pulmonary effort is normal.      Breath sounds: No wheezing or rales.   Abdominal:      General: Bowel sounds are normal.      Palpations: Abdomen is soft.      Tenderness: There is no abdominal tenderness.   Musculoskeletal:      Cervical back: Neck supple.       Right lower leg: No edema.      Left lower leg: No edema.   Neurological:      Mental Status: She is alert. Mental status is at baseline.   Psychiatric:         Mood and Affect: Mood normal.         Behavior: Behavior normal.         Thought Content: Thought content does not include suicidal ideation.       Beka Tenorio, DO

## 2024-04-24 NOTE — PATIENT INSTRUCTIONS
Taper down duloxetine, every other day for 1 week then 2 days of the week  Try qsymia once a day  Call with an update after taking Qsymia for a few weeks    Phentermine/Topiramate (By mouth)   Phentermine Hydrochloride (FEN-ter-meen nadia-droe-KLOR-delmar), Topiramate (toe-PIR-a-mate)  Used with diet and exercise to help you reach and maintain a healthy weight.   Brand Name(s): Qsymia   There may be other brand names for this medicine.  When This Medicine Should Not Be Used:   This medicine is not right for everyone. Do not use it if you had an allergic reaction to phentermine or topiramate, you are pregnant, or if you have glaucoma or an overactive thyroid.  How to Use This Medicine:   Long Acting Capsule  Take your medicine as directed. Your dose may need to be changed several times to find what works best for you. Carefully follow your doctor's instructions about diet and exercise.  Take this medicine in the morning, with or without food. It might keep you awake if you take it at night.  Swallow the extended-release capsule whole. Do not crush, break, or chew it.  Drink extra fluids so you will urinate more often and help prevent kidney problems.  This medicine should come with a Medication Guide. Ask your pharmacist for a copy if you do not have one.  Missed dose: If you miss a dose or forget to take your medicine, skip the missed dose. Take your regular dose the next morning. Do not take extra medicine to make up for the missed dose.  Store the medicine in a closed container at room temperature, away from heat, moisture, and direct light.  Drugs and Foods to Avoid:   Ask your doctor or pharmacist before using any other medicine, including over-the-counter medicines, vitamins, and herbal products.  Do not use this medicine if you have used an MAO inhibitor (MAOI) within the past 14 days.  Some medicines can affect how phentermine/topiramate works. Tell your doctor if you are using the following:  Acetazolamide,  amitriptyline, dichlorphenamide, methazolamide, pioglitazone, zonisamide  Birth control pills  Other diet pills (including nonprescription or herbal products)  Diuretic (water pill)  Medicine to treat seizures (including valproic acid, carbamazepine, phenytoin)  Tell your doctor if you use anything else that makes you sleepy. Some examples are allergy medicine, narcotic pain medicine, and alcohol.  Do not drink alcohol while you are using this medicine.  Warnings While Using This Medicine:   It is not safe to take this medicine during pregnancy. It could harm an unborn baby. Tell your doctor right away if you become pregnant. You should have a negative pregnancy test before you start taking this medicine and every month during treatment. Tell your doctor right away if you miss a period.  Tell your doctor if you have kidney disease, liver disease, diabetes, eye problems, heart failure, heart rhythm problems, seizures, or a history of depression, stroke, or heart attack.  Do not breastfeed during treatment with this medicine.  This medicine could cause the following problems:   Increased heart rate  Changes in mood or behavior, including thoughts of suicide  Eye pain or vision changes, including glaucoma  Metabolic acidosis (too much acid in the blood)  Decreased bone density  Slow growth in children  Changes in blood sugar levels  Changes in body temperature  Kidney stones  Serious skin reactions, including Estrada-Tyrell syndrome, toxic epidermal necrolysis  This medicine may cause you to feel dizzy, drowsy, or confused, or to have trouble thinking or speaking. Do not drive or do anything else that could be dangerous until you know how this medicine affects you.  This medicine contains a yellow dye called tartrazine, which may cause serious allergic reactions (including asthma). Tell your doctor if you have any allergies (including an aspirin allergy).  This medicine can be habit-forming. Do not use more than your  prescribed dose. Call your doctor if you think your medicine is not working.  Do not stop using this medicine suddenly. Your doctor will need to slowly decrease your dose before you stop it completely. You might have a seizure if you stop taking the medicine too fast.  Your doctor will do lab tests at regular visits to check on the effects of this medicine. Keep all appointments.  Keep all medicine out of the reach of children. Never share your medicine with anyone.  Possible Side Effects While Using This Medicine:   Call your doctor right away if you notice any of these side effects:  Allergic reaction: Itching or hives, swelling in your face or hands, swelling or tingling in your mouth or throat, chest tightness, trouble breathing  Blistering, peeling, red skin rash  Bloody or cloudy urine, painful urination, sudden lower back or stomach pain  Changes in vision, eye pain  Decreased sweating, fever, or feeling hot  Dry mouth, increased thirst, muscle cramps, nausea or vomiting  Fast breathing, loss of appetite, unusual tiredness  Fast, pounding, or uneven heartbeat  Feeling agitated, depressed, nervous, or irritable, thoughts of hurting yourself or others, unusual mood or behavior  Lightheadedness, dizziness, or fainting  Problems with speech or memory, trouble concentrating, confusion  Seizures  If you notice these less serious side effects, talk with your doctor:   Constipation  Metallic taste in your mouth  Numbness, tingling, or burning pain in your hands, arms, legs, or feet  If you notice other side effects that you think are caused by this medicine, tell your doctor.   Call your doctor for medical advice about side effects. You may report side effects to FDA at 6-699-FDA-5754  © Copyright [employer] 2023 Information is for End User's use only and may not be sold, redistributed or otherwise used for commercial purposes.  The above information is an  only. It is not intended as medical advice for  individual conditions or treatments. Talk to your doctor, nurse or pharmacist before following any medical regimen to see if it is safe and effective for you.

## 2024-04-25 ENCOUNTER — TELEPHONE (OUTPATIENT)
Age: 44
End: 2024-04-25

## 2024-04-25 NOTE — TELEPHONE ENCOUNTER
PA for Phentermine-Topiramate     Submitted via    []CMM-KEY    [x][employer]-Case ID # [id number]   []Faxed to plan   []Other website    []Phone call Case ID #      Office notes sent, clinical questions answered. Awaiting determination    Turnaround time for your insurance to make a decision on your Prior Authorization can take 7-21 business days.

## 2024-04-26 NOTE — TELEPHONE ENCOUNTER
PA for Phentermine-Topiramate Approved     Date(s) approved 4/23/24-7/23/24  Case # [id number]     Patient advised by [x] PT PALt Message                      [] Phone call      []LMOM     []L/M to call office as no active Communication consent on file     []Unable to leave detailed message as VM not approved on Communication consent         Pharmacy advised by [x]Fax                                     []Phone call    Approval letter scanned into Media No Approval via [employer]

## 2024-05-21 DIAGNOSIS — E66.9 CLASS 2 OBESITY WITHOUT SERIOUS COMORBIDITY WITH BODY MASS INDEX (BMI) OF 35.0 TO 35.9 IN ADULT, UNSPECIFIED OBESITY TYPE: ICD-10-CM

## 2024-05-21 NOTE — TELEPHONE ENCOUNTER
Iliana Flores Rai! Hope all is well!! So my prescription is almost done and I was wondering if I can get the higher dosage? I have had no side effects! Sleep well! No crazy thoughts have not lost much but I feel that is because I need a higher dosage for the Qsymia     My chart message

## 2024-05-21 NOTE — TELEPHONE ENCOUNTER
Let Shayla know    I sent the next higher dose of qsymia to \[facility]\"" pharmacy    (I sent it to [facility] in error first, canceled this and re-sent to \[facility]\"")    thanks

## 2024-06-26 ENCOUNTER — TELEPHONE (OUTPATIENT)
Dept: INTERNAL MEDICINE CLINIC | Facility: CLINIC | Age: 44
End: 2024-06-26

## 2024-06-26 DIAGNOSIS — E66.9 CLASS 2 OBESITY WITHOUT SERIOUS COMORBIDITY WITH BODY MASS INDEX (BMI) OF 35.0 TO 35.9 IN ADULT, UNSPECIFIED OBESITY TYPE: Primary | ICD-10-CM

## 2024-06-26 NOTE — TELEPHONE ENCOUNTER
Iliana Ireland!!! I was wondering if there is any way I can get a referral to weight management, I been taking the Qsymia as I should and doing everything right and I am not dropping so I’m wondering if going here would help? I don’t have a follow up till August with you .

## 2024-06-30 DIAGNOSIS — E66.9 CLASS 2 OBESITY WITHOUT SERIOUS COMORBIDITY WITH BODY MASS INDEX (BMI) OF 35.0 TO 35.9 IN ADULT, UNSPECIFIED OBESITY TYPE: ICD-10-CM

## 2024-07-01 ENCOUNTER — TELEPHONE (OUTPATIENT)
Dept: INTERNAL MEDICINE CLINIC | Facility: CLINIC | Age: 44
End: 2024-07-01

## 2024-07-01 DIAGNOSIS — E66.9 CLASS 2 OBESITY WITHOUT SERIOUS COMORBIDITY WITH BODY MASS INDEX (BMI) OF 35.0 TO 35.9 IN ADULT, UNSPECIFIED OBESITY TYPE: ICD-10-CM

## 2024-07-02 NOTE — TELEPHONE ENCOUNTER
Please notify Shayla    She should stay on the 7.5mg dose..cannot be increased at this time    I put the order in yesterday as her last refill of the medication    thanks

## 2024-07-15 ENCOUNTER — TELEPHONE (OUTPATIENT)
Dept: INTERNAL MEDICINE CLINIC | Facility: CLINIC | Age: 44
End: 2024-07-15

## 2024-07-16 ENCOUNTER — HOSPITAL ENCOUNTER (OUTPATIENT)
Dept: RADIOLOGY | Age: 44
Discharge: HOME | End: 2024-07-16
Payer: COMMERCIAL

## 2024-07-16 DIAGNOSIS — Z12.31 ENCOUNTER FOR SCREENING MAMMOGRAM FOR MALIGNANT NEOPLASM OF BREAST: ICD-10-CM

## 2024-07-16 PROCEDURE — 77067 SCR MAMMO BI INCL CAD: CPT

## 2024-07-16 PROCEDURE — 77063 BREAST TOMOSYNTHESIS BI: CPT

## 2024-08-02 ENCOUNTER — OFFICE VISIT (OUTPATIENT)
Dept: INTERNAL MEDICINE CLINIC | Facility: CLINIC | Age: 44
End: 2024-08-02
Payer: COMMERCIAL

## 2024-08-02 VITALS
WEIGHT: 219 LBS | HEART RATE: 102 BPM | BODY MASS INDEX: 34.37 KG/M2 | TEMPERATURE: 98 F | DIASTOLIC BLOOD PRESSURE: 88 MMHG | HEIGHT: 67 IN | OXYGEN SATURATION: 97 % | SYSTOLIC BLOOD PRESSURE: 132 MMHG

## 2024-08-02 DIAGNOSIS — E66.9 CLASS 1 OBESITY WITHOUT SERIOUS COMORBIDITY WITH BODY MASS INDEX (BMI) OF 34.0 TO 34.9 IN ADULT, UNSPECIFIED OBESITY TYPE: ICD-10-CM

## 2024-08-02 DIAGNOSIS — Z86.59 HISTORY OF DEPRESSION: ICD-10-CM

## 2024-08-02 DIAGNOSIS — R73.03 PRE-DIABETES: Primary | ICD-10-CM

## 2024-08-02 PROCEDURE — 99213 OFFICE O/P EST LOW 20 MIN: CPT | Performed by: INTERNAL MEDICINE

## 2024-08-02 RX ORDER — TIRZEPATIDE 5 MG/.5ML
5 INJECTION, SOLUTION SUBCUTANEOUS WEEKLY
Qty: 2 ML | Refills: 0 | Status: SHIPPED | OUTPATIENT
Start: 2024-08-26

## 2024-08-02 RX ORDER — TIRZEPATIDE 7.5 MG/.5ML
7.5 INJECTION, SOLUTION SUBCUTANEOUS WEEKLY
Qty: 2 ML | Refills: 0 | Status: SHIPPED | OUTPATIENT
Start: 2024-09-23

## 2024-08-02 RX ORDER — TIRZEPATIDE 2.5 MG/.5ML
2.5 INJECTION, SOLUTION SUBCUTANEOUS WEEKLY
Qty: 2 ML | Refills: 0 | Status: SHIPPED | OUTPATIENT
Start: 2024-08-02 | End: 2024-08-30

## 2024-08-02 NOTE — PROGRESS NOTES
Ambulatory Visit  Name: Shayla Casanova      : 1980      MRN: [id number]  Encounter Provider: Beka Tenorio DO  Encounter Date: 2024   Encounter department: [facility] INTERNAL MEDICINE    Assessment & Plan   1. Pre-diabetes  Comments:  c/w lower carb intake and exercising.  2. Class 1 obesity without serious comorbidity with body mass index (BMI) of 34.0 to 34.9 in adult, unspecified obesity type  Comments:  zepbound ordered & for the 1st 3 mos in a titrating dose.  f/u in 6 mos.  she will call if it is helping for higher dose for month #4 if no adverse effects  Orders:  -     tirzepatide (Zepbound) 2.5 mg/0.5 mL auto-injector; Inject 0.5 mL (2.5 mg total) under the skin once a week for 28 days Then go to 5mg once a week  -     tirzepatide (Zepbound) 5 mg/0.5 mL auto-injector; Inject 0.5 mL (5 mg total) under the skin once a week Then increase dose to 7.5mg once a week Do not start before 2024.  -     tirzepatide (Zepbound) 7.5 mg/0.5 mL auto-injector; Inject 0.5 mL (7.5 mg total) under the skin once a week Do not start before 2024.  3. History of depression  Comments:  doing better now, off all meds for months(seasonal affective disorder?)    Encouraged to exercise 5-6 days of the week for 30-45 mins each time      Depression Screening and Follow-up Plan: Patient was screened for depression during today's encounter. They screened negative with a PHQ-9 score of 0.      History of Present Illness     HPI    Here for follow up, Shayla has been taking Qsymia and lost ~7 lbs over the last 3.5 mos.  She is walking twice a week but not exercising otherwise.  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  She would like to try zepbound for weight loss instead.  She is no longer feeling depressed or anxious.  She is getting  next year in May.  ROS Otherwise negative, no other complaints.    Review of Systems   Constitutional:  Negative for fever.  "  HENT:  Negative for congestion.    Eyes:  Negative for visual disturbance.   Respiratory:  Negative for shortness of breath.    Cardiovascular:  Negative for chest pain.   Gastrointestinal:  Negative for abdominal pain.   Endocrine: Negative for polyuria.   Genitourinary:  Negative for difficulty urinating.   Musculoskeletal:  Negative for gait problem.   Skin:  Negative for rash.   Allergic/Immunologic: Negative for immunocompromised state.   Neurological:  Negative for dizziness.   Psychiatric/Behavioral:  Negative for dysphoric mood.      Current Outpatient Medications on File Prior to Visit   Medication Sig Dispense Refill    [DISCONTINUED] DULoxetine (CYMBALTA) 20 mg capsule Take 1 capsule (20 mg total) by mouth daily at bedtime 30 capsule 1    [DISCONTINUED] Phentermine-Topiramate 7.5-46 MG CP24 Take 1 tablet by mouth in the morning *final refill* 30 capsule 0     No current facility-administered medications on file prior to visit.      Objective     /88 (BP Location: Left arm, Patient Position: Sitting, Cuff Size: Standard)   Pulse 102   Temp 98 °F (36.7 °C)   Ht 5' 7\" (1.702 m)   Wt 99.3 kg (219 lb)   SpO2 97%   BMI 34.30 kg/m²     Physical Exam  Vitals reviewed.   Constitutional:       General: She is not in acute distress.     Appearance: Normal appearance. She is obese.   HENT:      Head: Normocephalic and atraumatic.      Right Ear: Tympanic membrane normal.      Left Ear: Tympanic membrane normal.   Eyes:      Conjunctiva/sclera: Conjunctivae normal.   Cardiovascular:      Rate and Rhythm: Normal rate and regular rhythm.      Heart sounds: No murmur heard.  Pulmonary:      Effort: Pulmonary effort is normal.      Breath sounds: No wheezing or rales.   Abdominal:      General: Abdomen is flat. Bowel sounds are normal.      Palpations: Abdomen is soft.      Tenderness: There is no abdominal tenderness.   Musculoskeletal:      Cervical back: Neck supple.      Right lower leg: No edema.      " Left lower leg: No edema.   Neurological:      Mental Status: She is alert. Mental status is at baseline.   Psychiatric:         Mood and Affect: Mood normal.         Behavior: Behavior normal.       Administrative Statements

## 2024-08-04 ENCOUNTER — TELEPHONE (OUTPATIENT)
Age: 44
End: 2024-08-04

## 2024-08-05 NOTE — TELEPHONE ENCOUNTER
PA for tirzepatide (Zepbound) 2.5 mg/0.5 mL  SUBMITTED     via    [][employer]-KEY    [x][employer]-Case ID # [id number]   []Faxed to plan   []Other website    []Phone call Case ID #      Office notes sent, clinical questions answered. Awaiting determination    Turnaround time for your insurance to make a decision on your Prior Authorization can take 7-21 business days.

## 2024-08-05 NOTE — TELEPHONE ENCOUNTER
PA for tirzepatide (Zepbound) 5 mg/0.5 mL  SUBMITTED     via    []CMM-KEY    [x][employer]-Case ID # [id number]   []Faxed to plan   []Other website    []Phone call Case ID #      Office notes sent, clinical questions answered. Awaiting determination    Turnaround time for your insurance to make a decision on your Prior Authorization can take 7-21 business days.

## 2024-08-14 NOTE — TELEPHONE ENCOUNTER
PA for Zepbound 2.5mg SUBMITTED     via    []CMM-KEY:   []Luisa-Case ID #   []Faxed to plan   []Other website   [x]Phone call Case ID #: [id number] per Sunni @ [facility]- Expedited case w/ turnaround time of 9-72 hrs. Call ref #: [id number]    Office notes sent, clinical questions answered. Awaiting determination    Turnaround time for your insurance to make a decision on your Prior Authorization can take 7-21 business days.

## 2024-08-19 PROBLEM — E66.811 CLASS 1 OBESITY WITHOUT SERIOUS COMORBIDITY WITH BODY MASS INDEX (BMI) OF 34.0 TO 34.9 IN ADULT: Status: ACTIVE | Noted: 2022-12-14

## 2024-09-23 ENCOUNTER — OFFICE VISIT (OUTPATIENT)
Dept: FAMILY MEDICINE CLINIC | Facility: CLINIC | Age: 44
End: 2024-09-23
Payer: COMMERCIAL

## 2024-09-23 VITALS
HEIGHT: 67 IN | TEMPERATURE: 97.1 F | BODY MASS INDEX: 33.27 KG/M2 | WEIGHT: 212 LBS | SYSTOLIC BLOOD PRESSURE: 131 MMHG | HEART RATE: 85 BPM | OXYGEN SATURATION: 98 % | DIASTOLIC BLOOD PRESSURE: 75 MMHG | RESPIRATION RATE: 16 BRPM

## 2024-09-23 DIAGNOSIS — Z79.899 MEDICATION MANAGEMENT: Primary | ICD-10-CM

## 2024-09-23 PROCEDURE — 99213 OFFICE O/P EST LOW 20 MIN: CPT | Performed by: FAMILY MEDICINE

## 2024-09-23 NOTE — PROGRESS NOTES
Ambulatory Visit  Name: Shayla Casanova      : 1980      MRN: [id number]  Encounter Provider: Bassam Carty DO  Encounter Date: 2024   Encounter department: [facility]    Assessment & Plan  Medication management  - Patient presents for office visit to review current progress while on tirzepatide for weight management   - Patient switched to zepbound a month ago and titrated up to 5 mg subQ weekly at this point  - Previously on Qsymia without weight loss   - Currently without any adverse side effects   - Noted 7 lb wt loss; goal of 6-8 lbs per month    Plan  - Continue current dose  - May increase by 2.5 mg/week increments every 4 weeks; max dose of 15 mg/week            History of Present Illness   Patient started on tirzepatide.   Currently taking 5 mg for one week.         Review of Systems   Constitutional:  Negative for chills and fever.   HENT:  Negative for ear pain and sore throat.    Eyes:  Negative for pain and visual disturbance.   Respiratory:  Negative for cough and shortness of breath.    Cardiovascular:  Negative for chest pain and palpitations.   Gastrointestinal:  Negative for abdominal pain and vomiting.   Genitourinary:  Negative for dysuria and hematuria.   Musculoskeletal:  Negative for arthralgias and back pain.   Skin:  Negative for color change and rash.   Neurological:  Negative for dizziness, light-headedness and headaches.   All other systems reviewed and are negative.    Past Medical History:   Diagnosis Date    Headache(784.0)     Just on and off specially after i eat    Obesity      Past Surgical History:   Procedure Laterality Date     SECTION  3/18/2008    CHOLECYSTECTOMY  2009    TUBAL LIGATION  2022     Family History   Problem Relation Age of Onset    Breast cancer Mother 70    Diabetes Mother     Hypertension Mother     Heart attack Father 60    Bone cancer Father     No Known Problems Daughter     Hyperlipidemia Maternal  "Grandmother     Pancreatitis Neg Hx     Thyroid cancer Neg Hx      Social History     Tobacco Use    Smoking status: Former     Current packs/day: 0.00     Average packs/day: 0.3 packs/day for 5.0 years (1.3 ttl pk-yrs)     Types: Cigarettes     Start date: 2011     Quit date: 2016     Years since quittin.8    Smokeless tobacco: Never   Vaping Use    Vaping status: Never Used   Substance and Sexual Activity    Alcohol use: Yes     Alcohol/week: 1.0 standard drink of alcohol     Types: 1 Glasses of wine per week    Drug use: Never    Sexual activity: Yes     Partners: Male     Birth control/protection: Female Sterilization     Current Outpatient Medications on File Prior to Visit   Medication Sig    tirzepatide (Zepbound) 5 mg/0.5 mL auto-injector Inject 0.5 mL (5 mg total) under the skin once a week Then increase dose to 7.5mg once a week Do not start before 2024.    tirzepatide (Zepbound) 7.5 mg/0.5 mL auto-injector Inject 0.5 mL (7.5 mg total) under the skin once a week Do not start before 2024. (Patient not taking: Reported on 2024)     Allergies   Allergen Reactions    Other Edema and Hives     Reaction Date: 2016;     Prednisone Hives    Wellbutrin [Bupropion] Other (See Comments)     Caused mood changes     Immunization History   Administered Date(s) Administered    COVID-19 PFIZER VACCINE 0.3 ML IM 2021, 2021    COVID-19 Pfizer Vac BIVALENT Ben-sucrose 12 Yr+ IM 2022    INFLUENZA 10/10/2022, 10/10/2023    MMR 10/10/2022, 2022    Tdap 2017     Objective   /75 (BP Location: Left arm, Patient Position: Sitting, Cuff Size: Large)   Pulse 85   Temp (!) 97.1 °F (36.2 °C) (Temporal)   Resp 16   Ht 5' 7\" (1.702 m)   Wt 96.2 kg (212 lb)   SpO2 98%   BMI 33.20 kg/m²     Physical Exam  Vitals and nursing note reviewed.   Constitutional:       General: She is not in acute distress.     Appearance: Normal appearance. She is " well-developed. She is not ill-appearing.   HENT:      Head: Normocephalic and atraumatic.      Right Ear: External ear normal.      Left Ear: External ear normal.      Nose: Nose normal.   Eyes:      Conjunctiva/sclera: Conjunctivae normal.   Cardiovascular:      Rate and Rhythm: Normal rate and regular rhythm.      Pulses: Normal pulses.      Heart sounds: Normal heart sounds. No murmur heard.  Pulmonary:      Effort: Pulmonary effort is normal. No respiratory distress.      Breath sounds: Normal breath sounds.   Abdominal:      Palpations: Abdomen is soft.      Tenderness: There is no abdominal tenderness.   Musculoskeletal:         General: No swelling.      Cervical back: Neck supple.      Right lower leg: No edema.      Left lower leg: No edema.   Skin:     General: Skin is warm and dry.      Capillary Refill: Capillary refill takes less than 2 seconds.   Neurological:      Mental Status: She is alert.   Psychiatric:         Mood and Affect: Mood normal.

## 2024-10-22 DIAGNOSIS — R73.03 PRE-DIABETES: ICD-10-CM

## 2024-10-22 DIAGNOSIS — E66.811 CLASS 1 OBESITY WITHOUT SERIOUS COMORBIDITY WITH BODY MASS INDEX (BMI) OF 34.0 TO 34.9 IN ADULT, UNSPECIFIED OBESITY TYPE: Primary | ICD-10-CM

## 2024-10-22 RX ORDER — TIRZEPATIDE 10 MG/.5ML
10 INJECTION, SOLUTION SUBCUTANEOUS WEEKLY
Qty: 2 ML | Refills: 0 | Status: SHIPPED | OUTPATIENT
Start: 2024-10-22

## 2024-11-15 ENCOUNTER — TELEPHONE (OUTPATIENT)
Dept: BARIATRICS | Facility: CLINIC | Age: 44
End: 2024-11-15

## 2024-11-15 NOTE — TELEPHONE ENCOUNTER
Weight History     Highest Weight: 225#  Lowest Weight: 168#    Past Attempts at Weight Loss: Medication and phenetermine , Wt loss practice. Currently on Zepbound    Food Recall  Breakfast: eggs 1-2, and a slice of toast, juice  Snack: skip  Lunch: Heaviest meal at lunch at [facility] cafeteria hot lunch. Chicken with Alexandra rice with vegetable + soda occasionally  Snack: skip  Dinner: Homemade: starch+ protein  Snack: Tastycake    Beverages: Water  Volume of Beverage Intake:  around 50 oz of water/day    Frequency of Dining out: 5 days a week( lunch at [facility])    Would the patient benefit from visit with BH specialist?:  n/a    Physical Activity Intake  Activity: Walking  Frequency of Exercise: 30- 45 mins/ day 5 days a week  Physical Limitations to Exercise: n/a    Review of Programs  Overview of medical weight management programs provided?: No  Is patient interested in discussing medication?: Yes   Is patient interested in discussing bariatric surgery?: No  Does patient know which pathway they are interested in?: Yes Medication and eating healthier

## 2024-11-18 NOTE — PROGRESS NOTES
Assessment/Plan:  Shayla was seen today for consult.    Diagnoses and all orders for this visit:    Pre-diabetes  -     Hemoglobin A1C; Future    Obesity, Class I, BMI 30-34.9  -     Ambulatory Referral to Weight Management         - Discussed options of HealthyCORE-Intensive Lifestyle Intervention Program, Very Low Calorie Diet-VLCD, and Conservative Program and the role of weight loss medications.  - Explained the importance of making lifestyle changes first before starting anti-obesity medications.  - Patient should demonstrate lifestyle changes first before anti-obesity medication initiated.   - Patient is interested in pursuing Conservative Program  - Initial weight loss goal of 5-10% weight loss for improved health as studies have shown this is where we see the greatest impact on improving health and decreasing risk of obesity related conditions.  - Weight loss can improve patient's co-morbid conditions and/or prevent weight-related complications.    - Labs reviewed: As below.      General Recommendations:  Nutrition:  Eat breakfast daily.  Do not skip meals.     Food log (ie.) www.Whelse.com, sparkpeople.com, loseit.com, calorieking.com, etc.    Practice mindful eating.  Be sure to set aside time to eat, eat slowly, and savor your food.    Hydration:    At least 64oz of water daily.  No sugar sweetened beverages.  No juice (eat the fruit instead).    Exercise:  Studies have shown that the ideal exercise goal is somewhere between 150 to 300 minutes of moderate intensity exercise a week.  Start with exercising 10 minutes every other day and gradually increase physical activity with a goal of at least 150 minutes of moderate intensity exercise a week, divided over at least 3 days a week.  An example of this would be exercising 30 minutes a day, 5 days a week.  Resistance training can increase muscle mass and increase our resting metabolic rate.   FULL BODY resistance training is recommended 2-3 times a  week.  Do not do this on consecutive days to allow for muscle recovery.    Aim for a bare minimum 5000 steps, even on days you do not exercise.    Monitoring:   Weigh yourself daily.  If this causes undue stress, then just weigh yourself once a week.  Weigh yourself the same time of the day with the same amount of clothing on.  Preferably this should be done after waking up, before you eat, and with no clothing or minimal clothing on.    Calorie goal:  8215-8721 toby/day (Provided with meal plan to follow).    Return visit:    Calorie tracking/deficit - RD menu planning + body composition testing  Physical activity goals - discuss Thrive program to increase resistance training   AOM  Contraception: tubal ligation  Continue zepbound 10mg  - Patient denies personal history of pancreatitis. Patient also denies personal and family history of thyroid cancer and multiple endocrine neoplasia type 2 (MEN 2 tumor).   RTC: 4 months          Total time spent reviewing chart, interviewing patient, examining patient, discussing plan, answering all questions, and documentin min.       ______________________________________________________________________        Subjective:   Chief Complaint   Patient presents with    Consult     Mwm consult       HPI: Shayla Casanova  is a 44 y.o. female with history of depression, prediabetes, and excess weight, here to pursue weight loss management.  Previous notes and records have been reviewed.    Previuosly prescribed Qsymia     Started Zepbound 2024  Current dose: 10mg  Start weight: 219 lbs (24)  Current weight: 199 lbs BMI 32.23 (24)  Change: -20 lbs (-9.13%TBW)  Goal: 170-180 lbs    Denies nausea, vomtiign, constipation, diarrhea.  Noticing appetite suppression & early satiety.    Monthly menstrual cycles.      Hemoglobin A1c 5.7 (3/22/24)  TSH WNL    HPI  Wt Readings from Last 20 Encounters:   24 90.5 kg (199 lb 9.6 oz)   24 96.2 kg (212 lb)   24 99.3  kg (219 lb)   24 103 kg (226 lb)   23 102 kg (225 lb 12.8 oz)   22 102 kg (225 lb)   04/15/21 90.7 kg (200 lb)   10/11/16 101 kg (222 lb)   16 100 kg (221 lb 2 oz)   16 101 kg (222 lb 4 oz)   14 96.7 kg (213 lb 2 oz)   13 100 kg (220 lb 9.6 oz)     Weight History      Highest Weight: 225#  Lowest Weight: 168#     Past Attempts at Weight Loss: Medication and phenetermine , Wt loss practice. Currently on Zepbound     Food Recall  Breakfast: eggs 1-2, and a slice of toast, juice  Snack: skip  Lunch: Heaviest meal at lunch at [facility] cafeteria hot lunch. Chicken with Alexandra rice with vegetable + soda occasionally  Snack: skip  Dinner: Homemade: starch (rice, potatoes)+ protein (chicken and steak) + vegetables (broccoli)  Snack: Tastycake     Beverages: Water  Volume of Beverage Intake:  around 50 oz of water/day     Frequency of Dining out: 5 days a week( lunch at [facility])     Would the patient benefit from visit with  specialist?:  n/a     Physical Activity Intake  Activity: Walking  Frequency of Exercise: 30- 45 mins/ day 5 days a week  Physical Limitations to Exercise: n/a      Occupation: radiology tech with IR      Past Medical History:   Diagnosis Date    Headache(784.0)     Just on and off specially after i eat    Obesity         Past Surgical History:   Procedure Laterality Date     SECTION  3/18/2008    CHOLECYSTECTOMY  2009    TUBAL LIGATION  2022     The following portions of the patient's history were reviewed and updated as appropriate: allergies, current medications, past family history, past medical history, past social history, past surgical history, and problem list.    Review Of Systems:  Review of Systems   Constitutional:  Negative for fatigue and fever.   HENT:  Negative for sore throat, trouble swallowing and voice change.    Respiratory:  Negative for shortness of breath.    Cardiovascular:  Negative for chest pain.   Gastrointestinal:   "Negative for abdominal pain, constipation, diarrhea, nausea and vomiting.   Endocrine: Negative for cold intolerance and heat intolerance.   Genitourinary:  Negative for difficulty urinating.   Musculoskeletal:  Negative for arthralgias and back pain.   Psychiatric/Behavioral:  Negative for suicidal ideas. The patient is not nervous/anxious.    All other systems reviewed and are negative.      Objective:  Ht 5' 5.98\" (1.676 m)   Wt 90.5 kg (199 lb 9.6 oz)   BMI 32.23 kg/m²   Physical Exam  Vitals and nursing note reviewed.   Constitutional:       General: She is not in acute distress.     Appearance: Normal appearance. She is obese. She is not ill-appearing, toxic-appearing or diaphoretic.   HENT:      Head: Normocephalic and atraumatic.   Eyes:      General:         Right eye: No discharge.         Left eye: No discharge.      Conjunctiva/sclera: Conjunctivae normal.   Pulmonary:      Effort: Pulmonary effort is normal. No respiratory distress.   Musculoskeletal:         General: Normal range of motion.      Cervical back: Normal range of motion. No rigidity.      Right lower leg: No edema.      Left lower leg: No edema.   Skin:     Coloration: Skin is not pale.      Findings: No erythema or rash.   Neurological:      General: No focal deficit present.      Mental Status: She is alert.   Psychiatric:         Mood and Affect: Mood normal.         Labs and Imaging  Recent labs and imaging have been personally reviewed.  Lab Results   Component Value Date    WBC 11.17 (H) 03/22/2024    HGB 12.8 03/22/2024    HCT 39.9 03/22/2024    MCV 96 03/22/2024     03/22/2024     Lab Results   Component Value Date    SODIUM 135 03/22/2024    K 4.0 03/22/2024     03/22/2024    CO2 24 03/22/2024    AGAP 8 03/22/2024    BUN 14 03/22/2024    CREATININE 0.68 03/22/2024    GLUC 97 12/28/2020    GLUF 108 (H) 03/22/2024    CALCIUM 8.6 03/22/2024    AST 18 03/22/2024    ALT 20 03/22/2024    ALKPHOS 73 03/22/2024    TP 6.8 " 03/22/2024    TBILI 0.28 03/22/2024    EGFR 105 03/22/2024     Lab Results   Component Value Date    HGBA1C 5.7 (H) 03/22/2024     Lab Results   Component Value Date    WHX9DTJQAQYM 2.604 04/15/2024     Lab Results   Component Value Date    CHOLESTEROL 160 03/22/2024     Lab Results   Component Value Date    HDL 43 (L) 03/22/2024     Lab Results   Component Value Date    TRIG 150 (H) 03/22/2024     Lab Results   Component Value Date    LDLCALC 87 03/22/2024

## 2024-11-19 ENCOUNTER — OFFICE VISIT (OUTPATIENT)
Age: 44
End: 2024-11-19
Payer: COMMERCIAL

## 2024-11-19 VITALS — WEIGHT: 199.6 LBS | HEIGHT: 66 IN | BODY MASS INDEX: 32.08 KG/M2

## 2024-11-19 DIAGNOSIS — E66.811 OBESITY, CLASS I, BMI 30-34.9: ICD-10-CM

## 2024-11-19 DIAGNOSIS — R73.03 PRE-DIABETES: Primary | ICD-10-CM

## 2024-11-19 PROCEDURE — 99244 OFF/OP CNSLTJ NEW/EST MOD 40: CPT | Performed by: PHYSICIAN ASSISTANT

## 2024-11-25 DIAGNOSIS — R73.03 PRE-DIABETES: ICD-10-CM

## 2024-11-25 DIAGNOSIS — E66.811 CLASS 1 OBESITY WITHOUT SERIOUS COMORBIDITY WITH BODY MASS INDEX (BMI) OF 34.0 TO 34.9 IN ADULT, UNSPECIFIED OBESITY TYPE: ICD-10-CM

## 2024-11-25 RX ORDER — TIRZEPATIDE 10 MG/.5ML
10 INJECTION, SOLUTION SUBCUTANEOUS WEEKLY
Qty: 2 ML | Refills: 3 | Status: SHIPPED | OUTPATIENT
Start: 2024-11-25 | End: 2024-12-02 | Stop reason: SDUPTHER

## 2024-12-02 DIAGNOSIS — E66.811 CLASS 1 OBESITY WITHOUT SERIOUS COMORBIDITY WITH BODY MASS INDEX (BMI) OF 34.0 TO 34.9 IN ADULT, UNSPECIFIED OBESITY TYPE: ICD-10-CM

## 2024-12-02 DIAGNOSIS — R73.03 PRE-DIABETES: ICD-10-CM

## 2024-12-03 RX ORDER — TIRZEPATIDE 10 MG/.5ML
10 INJECTION, SOLUTION SUBCUTANEOUS WEEKLY
Qty: 2 ML | Refills: 4 | Status: SHIPPED | OUTPATIENT
Start: 2024-12-03

## 2024-12-17 ENCOUNTER — CLINICAL SUPPORT (OUTPATIENT)
Age: 44
End: 2024-12-17
Payer: COMMERCIAL

## 2024-12-17 VITALS — BODY MASS INDEX: 31.34 KG/M2 | WEIGHT: 195 LBS | HEIGHT: 66 IN

## 2024-12-17 VITALS — HEIGHT: 66 IN | BODY MASS INDEX: 31.34 KG/M2 | WEIGHT: 195 LBS

## 2024-12-17 DIAGNOSIS — E66.811 CLASS 1 OBESITY WITHOUT SERIOUS COMORBIDITY WITH BODY MASS INDEX (BMI) OF 31.0 TO 31.9 IN ADULT: Primary | ICD-10-CM

## 2024-12-17 PROCEDURE — WEIGHT

## 2024-12-17 PROCEDURE — S9470 NUTRITIONAL COUNSELING, DIET: HCPCS

## 2024-12-17 PROCEDURE — RECHECK

## 2024-12-17 NOTE — PROGRESS NOTES
Weight Management Medical Nutrition Assessment      Shayla presented for a meal planning session. Today's weight is 195.      Per dietary recall patient consumes excess calories from larger portion sizes prior to starting weight loss medication. At this time she is tolerating 7mg of Zepbound with some appetite increase towards end of week dosage. When she does eat, she does get full; therefore, the dose is working. Per Provider, she is keeping her at 7mg at this time to get used to dose versus creating a tolerance. She has had significant weight loss, going from 226 to today's weight of 195, ~30# overall. Shayla is getting  (yay!) in May and is OK with the slow and steady process of weight loss for her life changing event. She is consuming sufficient amounts of protein at this time and is encouraged to continue doing what she is doing.     Developed and reviewed a low calorie meal plan of 9167-8372 calories per day. This will meet her needs as she is on her feet for her job. Currently she is not exercising formally and that is OK. We will revisit this at a later date when the weather gets better outside; however, she does have access to a gym at her apartment complex.     Suggest to follow up with body composition in 2 months.     Patient seen by Medical Provider in past 6 months:  yes  Requested to schedule appointment with Medical Provider: Yes      Anthropometric Measurements  Start Weight (#) & Date: 199.9 11/19 with provider   Current Weight (#): 195#   TBW % Change from start weight:-  Ideal Body Weight (#):120lb  Goal Weight (#):185  Highest Weight: 225#  Lowest Weight: 168#    Weight Loss History  Previous weight loss attempts: Counseling with  MD  Exercise  FAD Diets (Cabbage soup, Grapefruit, Cleanse, etc.)  Self Created Diets (Portion Control, Healthy Food Choices, etc.)    Food and Nutrition Related History  Wake up: --   Bed Time:-    Food Recall  Breakfast:toast with hard boiled eggs; little  cup of potatoes, water     Snack:skip  Lunch:[employer] - smaller portion sizes (4 oz) 1 1/2 cups rice and 1 cup cooked veggies   Snack: maybe when go home might grab something (hungry) - get out of work at 4, get home at 445 and might grab something   Dinner: similar   Snack:-    Beverages: water and juice, twice a week glass of wine   Volume of beverage intake: 50oz    Weekends: Worse, eats less on the weekend   Cravings: sometimes craves tacos  Trouble area of day:between lunch and dinner    Frequency of Eating out: just at work daily but no formal dining/takeout  Food restrictions:-  Cooking: self   Food Shopping: self    Physical Activity Intake  Activity: Walking  Frequency of Exercise: 30- 45 mins/ day 5 days a week  Physical Limitations to Exercise: n/a    Estimated Needs  Energy  SECA: BMR:1575      X 1.3 -1000 = 2902-2191 = 1047  [facility] Energy Needs: BMR : 1525   1-2# loss weekly sedentary:  830-1330             1-2# loss weekly lightly active:6971-3328  Maintenance calories for sedentary activity level: 1830  Protein:68-85      (1.2-1.5g/kg IBW)  Fluid: 65     (35mL/kg IBW)    Nutrition Diagnosis  Yes;    Excessive energy intake  related to Food and nutrition related knowledge deficit concerning energy intake as evidenced by  BMI >25 for adults       Nutrition Intervention    Nutrition Prescription  Calories: 0458-5396   Protein:75g  Fluid:65    Meal Plan (Ángel/Pro/Carb)  Breakfast: 300-350 ángel/15g PRO  Snack:  Lunch: 400-500 ángel/30g PRO  Snack: 150 ángel/10g PRO  Dinner: 400-500 ángel/30g PRO  Snack:    Nutrition Education:    Calorie controlled menu  Lean protein food choices  Healthy snack options  Food journaling tips      Nutrition Counseling:  Strategies: meal planning, portion sizes, healthy snack choices, hydration, fiber intake, protein intake, exercise, food journal      Monitoring and Evaluation:  Evaluation criteria:  Energy Intake  Meet protein needs  Maintain adequate hydration  Monitor  weekly weight  Meal planning/preparation  Food journal   Decreased portions at mealtimes and snacks  Physical activity     Barriers to learning:none  Readiness to change: Action:  (Changing behavior)  Comprehension: very good  Expected Compliance: very good

## 2024-12-24 DIAGNOSIS — E66.811 CLASS 1 OBESITY WITHOUT SERIOUS COMORBIDITY WITH BODY MASS INDEX (BMI) OF 34.0 TO 34.9 IN ADULT, UNSPECIFIED OBESITY TYPE: ICD-10-CM

## 2024-12-24 DIAGNOSIS — R73.03 PRE-DIABETES: ICD-10-CM

## 2024-12-24 RX ORDER — TIRZEPATIDE 10 MG/.5ML
10 INJECTION, SOLUTION SUBCUTANEOUS WEEKLY
Qty: 2 ML | Refills: 0 | OUTPATIENT
Start: 2024-12-24

## 2025-01-01 ENCOUNTER — OFFICE VISIT (OUTPATIENT)
Dept: URGENT CARE | Age: 45
End: 2025-01-01
Payer: COMMERCIAL

## 2025-01-01 VITALS
WEIGHT: 195 LBS | RESPIRATION RATE: 18 BRPM | BODY MASS INDEX: 31.49 KG/M2 | SYSTOLIC BLOOD PRESSURE: 112 MMHG | OXYGEN SATURATION: 97 % | DIASTOLIC BLOOD PRESSURE: 78 MMHG | TEMPERATURE: 97.4 F | HEART RATE: 94 BPM

## 2025-01-01 DIAGNOSIS — H69.92 EUSTACHIAN TUBE DYSFUNCTION, LEFT: ICD-10-CM

## 2025-01-01 DIAGNOSIS — J00 ACUTE NASOPHARYNGITIS: Primary | ICD-10-CM

## 2025-01-01 PROCEDURE — 99204 OFFICE O/P NEW MOD 45 MIN: CPT | Performed by: EMERGENCY MEDICINE

## 2025-01-01 RX ORDER — DEXTROMETHORPHAN HYDROBROMIDE AND PROMETHAZINE HYDROCHLORIDE 15; 6.25 MG/5ML; MG/5ML
5 SYRUP ORAL
Qty: 118 ML | Refills: 0 | Status: SHIPPED | OUTPATIENT
Start: 2025-01-01

## 2025-01-01 RX ORDER — IBUPROFEN 800 MG/1
800 TABLET, FILM COATED ORAL EVERY 8 HOURS PRN
Qty: 30 TABLET | Refills: 0 | Status: SHIPPED | OUTPATIENT
Start: 2025-01-01

## 2025-01-01 RX ORDER — BENZONATATE 200 MG/1
200 CAPSULE ORAL 3 TIMES DAILY PRN
Qty: 20 CAPSULE | Refills: 0 | Status: SHIPPED | OUTPATIENT
Start: 2025-01-01

## 2025-01-01 NOTE — PROGRESS NOTES
[facility]        NAME: Shayla Casanova is a 44 y.o. female  : 1980    MRN: [id number]  DATE: 2025  TIME: 12:45 PM      Assessment and Plan     Acute nasopharyngitis [J00]  1. Acute nasopharyngitis  promethazine-dextromethorphan (PHENERGAN-DM) 6.25-15 mg/5 mL oral syrup    benzonatate (TESSALON) 200 MG capsule    ibuprofen (MOTRIN) 800 mg tablet          POC Testing Results        Note:       Patient Instructions     Patient Instructions   You may take OTC cough medication such as Mucinex DM for cough/congestion.    Please gargle with salt water as needed for sore throat. Please increase fluid intake.    For sinus congestion you may use Afrin nasal spray. Please spray twice in each nostril every 12 hours as needed for congestion. Please do NOT use this medication for more than four days!!       Follow up with primary care provider.   Go to ER if symptoms worsen.    Chief Complaint     Chief Complaint   Patient presents with    Cold Like Symptoms    Cough     Pt c/o cough and sore throat, left ear pain, left rib pain beginning day before Valery. Has been taking Mucinex         History of Present Illness     Pt reports cough, congestion, left ear back, left back pain for the past week. She denies fever, n/v/d or shortness of breath. She is taking Mucinex without relief.        Review of Systems     Review of Systems   Constitutional:  Negative for chills and fever.   HENT:  Positive for congestion, ear pain, sinus pressure and sore throat. Negative for ear discharge, sinus pain, sneezing and voice change.    Eyes:  Negative for redness.   Respiratory:  Positive for cough. Negative for shortness of breath and wheezing.    Cardiovascular:  Negative for chest pain.   Gastrointestinal:  Negative for abdominal pain, diarrhea, nausea and vomiting.   Skin:  Negative for rash.   Neurological:  Negative for syncope.         Current Medications       Current Outpatient Medications:      benzonatate (TESSALON) 200 MG capsule, Take 1 capsule (200 mg total) by mouth 3 (three) times a day as needed for cough, Disp: 20 capsule, Rfl: 0    ibuprofen (MOTRIN) 800 mg tablet, Take 1 tablet (800 mg total) by mouth every 8 (eight) hours as needed for mild pain or moderate pain, Disp: 30 tablet, Rfl: 0    promethazine-dextromethorphan (PHENERGAN-DM) 6.25-15 mg/5 mL oral syrup, Take 5 mL by mouth daily at bedtime as needed for cough, Disp: 118 mL, Rfl: 0    tirzepatide (Zepbound) 10 mg/0.5 mL auto-injector, Inject 0.5 mL (10 mg total) under the skin once a week, Disp: 2 mL, Rfl: 4    Current Allergies     Allergies as of 2025 - Reviewed 2025   Allergen Reaction Noted    Other Hives and Edema 2016    Prednisone Hives 2022    Wellbutrin [bupropion] Other (See Comments) 2024              The following portions of the patient's history were reviewed and updated as appropriate: allergies, current medications, past family history, past medical history, past social history, past surgical history, and problem list.     Past Medical History:   Diagnosis Date    Headache(784.0)     Just on and off specially after i eat    Obesity        Past Surgical History:   Procedure Laterality Date     SECTION  3/18/2008    CHOLECYSTECTOMY  2009    TUBAL LIGATION  2022       Family History   Problem Relation Age of Onset    Breast cancer Mother 70    Diabetes Mother     Hypertension Mother     Heart attack Father 60    Bone cancer Father     No Known Problems Daughter     Hyperlipidemia Maternal Grandmother     Pancreatitis Neg Hx     Thyroid cancer Neg Hx          Medications have been verified.        Objective     /78   Pulse 94   Temp (!) 97.4 °F (36.3 °C)   Resp 18   Wt 88.5 kg (195 lb)   LMP 2024 (Exact Date)   SpO2 97%   BMI 31.49 kg/m²   Patient's last menstrual period was 2024 (exact date).         Physical Exam     Physical Exam  Vitals and nursing note  reviewed.   Constitutional:       General: She is not in acute distress.     Appearance: Normal appearance. She is not ill-appearing, toxic-appearing or diaphoretic.   HENT:      Head: Normocephalic and atraumatic.      Right Ear: Tympanic membrane, ear canal and external ear normal. No middle ear effusion. There is no impacted cerumen. No foreign body. No mastoid tenderness. No PE tube. No hemotympanum. Tympanic membrane is not injected, scarred, perforated, erythematous, retracted or bulging.      Left Ear: Ear canal and external ear normal.  No middle ear effusion. There is no impacted cerumen. No foreign body. No mastoid tenderness. No PE tube. No hemotympanum. Tympanic membrane is injected and retracted. Tympanic membrane is not scarred, perforated, erythematous or bulging.      Nose: Nose normal.      Mouth/Throat:      Lips: Pink. No lesions.      Mouth: Mucous membranes are moist.      Tongue: No lesions. Tongue does not deviate from midline.      Palate: No mass and lesions.      Pharynx: Oropharynx is clear. Uvula midline. No pharyngeal swelling, oropharyngeal exudate, posterior oropharyngeal erythema or uvula swelling.      Tonsils: No tonsillar exudate or tonsillar abscesses.   Eyes:      General: No scleral icterus.        Right eye: No discharge.         Left eye: No discharge.      Extraocular Movements: Extraocular movements intact.      Conjunctiva/sclera: Conjunctivae normal.      Pupils: Pupils are equal, round, and reactive to light.   Cardiovascular:      Rate and Rhythm: Normal rate and regular rhythm.      Heart sounds: Normal heart sounds. No murmur heard.     No friction rub. No gallop.   Pulmonary:      Effort: Pulmonary effort is normal. No respiratory distress.      Breath sounds: Normal breath sounds. No stridor. No wheezing, rhonchi or rales.   Musculoskeletal:      Cervical back: Normal range of motion and neck supple. No rigidity.   Lymphadenopathy:      Cervical: No cervical  adenopathy.   Skin:     General: Skin is warm and dry.      Coloration: Skin is not jaundiced or pale.      Findings: No bruising, erythema, lesion or rash.   Neurological:      General: No focal deficit present.      Mental Status: She is alert and oriented to person, place, and time. Mental status is at baseline.   Psychiatric:         Mood and Affect: Mood normal.         Behavior: Behavior normal.         Thought Content: Thought content normal.         Judgment: Judgment normal.

## 2025-01-01 NOTE — PATIENT INSTRUCTIONS
You may take OTC cough medication such as Mucinex DM for cough/congestion.    Please gargle with salt water as needed for sore throat. Please increase fluid intake.    For sinus congestion you may use Afrin nasal spray. Please spray twice in each nostril every 12 hours as needed for congestion. Please do NOT use this medication for more than four days!!

## 2025-03-25 ENCOUNTER — OFFICE VISIT (OUTPATIENT)
Dept: FAMILY MEDICINE CLINIC | Facility: CLINIC | Age: 45
End: 2025-03-25
Payer: COMMERCIAL

## 2025-03-25 VITALS
TEMPERATURE: 98 F | OXYGEN SATURATION: 98 % | HEART RATE: 83 BPM | BODY MASS INDEX: 29.55 KG/M2 | RESPIRATION RATE: 18 BRPM | DIASTOLIC BLOOD PRESSURE: 66 MMHG | SYSTOLIC BLOOD PRESSURE: 120 MMHG | WEIGHT: 183 LBS

## 2025-03-25 DIAGNOSIS — Z13.228 SCREENING FOR METABOLIC DISORDER: ICD-10-CM

## 2025-03-25 DIAGNOSIS — Z00.00 ANNUAL PHYSICAL EXAM: Primary | ICD-10-CM

## 2025-03-25 DIAGNOSIS — Z13.220 SCREENING, LIPID: ICD-10-CM

## 2025-03-25 DIAGNOSIS — Z13.0 SCREENING, IRON DEFICIENCY ANEMIA: ICD-10-CM

## 2025-03-25 DIAGNOSIS — Z11.4 SCREENING FOR HIV (HUMAN IMMUNODEFICIENCY VIRUS): ICD-10-CM

## 2025-03-25 DIAGNOSIS — Z12.4 SCREENING FOR CERVICAL CANCER: ICD-10-CM

## 2025-03-25 DIAGNOSIS — Z11.59 NEED FOR HEPATITIS C SCREENING TEST: ICD-10-CM

## 2025-03-25 DIAGNOSIS — Z86.59 HISTORY OF DEPRESSION: ICD-10-CM

## 2025-03-25 DIAGNOSIS — E66.811 CLASS 1 OBESITY WITHOUT SERIOUS COMORBIDITY WITH BODY MASS INDEX (BMI) OF 34.0 TO 34.9 IN ADULT, UNSPECIFIED OBESITY TYPE: ICD-10-CM

## 2025-03-25 PROBLEM — R73.03 PRE-DIABETES: Status: RESOLVED | Noted: 2024-03-22 | Resolved: 2025-03-25

## 2025-03-25 PROCEDURE — 99396 PREV VISIT EST AGE 40-64: CPT | Performed by: FAMILY MEDICINE

## 2025-03-25 NOTE — ASSESSMENT & PLAN NOTE
-Current BMI of 29.55  - Patient continues on tirzepatide 10 mg q. weekly  - Patient has notable 43 pound weight loss over the course of last year    Plan  - Continue patient on tirzepatide and titrate up as appropriate

## 2025-03-25 NOTE — PROGRESS NOTES
Adult Annual Physical  Name: Shayla Casanova      : 1980      MRN: [id number]  Encounter Provider: Bassam Carty DO  Encounter Date: 3/25/2025   Encounter department: [facility]    Assessment & Plan  Annual physical exam  -No acute complaints or concerns  - Denies any recent unchanged health status       Class 1 obesity without serious comorbidity with body mass index (BMI) of 34.0 to 34.9 in adult, unspecified obesity type  -Current BMI of 29.55  - Patient continues on tirzepatide 10 mg q. weekly  - Patient has notable 43 pound weight loss over the course of last year    Plan  - Continue patient on tirzepatide and titrate up as appropriate       History of depression  -No acute complaints or concerns regarding this time       Screening for cervical cancer  -Per care gaps  Orders:    Ambulatory referral to Obstetrics / Gynecology; Future    Preventive Screenings:  - Diabetes Screening: orders placed and screening up-to-date  - Cholesterol Screening: orders placed   - Hepatitis C screening: orders placed   - HIV screening: screening up-to-date and orders placed   - Breast cancer screening: screening up-to-date and orders placed   - Colon cancer screening: screening up-to-date   - Lung cancer screening: screening not indicated     Immunizations:  - Immunizations due: Influenza      Depression Screening and Follow-up Plan: Patient was screened for depression during today's encounter. They screened negative with a PHQ-2 score of 0.          History of Present Illness     Adult Annual Physical:  Patient presents for annual physical. Patient presents for annual physical exam without any acute complaints or concerns this time.  Patient denies any recent notes or change in health status..     Diet and Physical Activity:  - Diet/Nutrition: no special diet, portion control and limited junk food.  - Exercise: walking.    Depression Screening:  - PHQ-2 Score: 0    General Health:  -  Sleep: sleeps well and 7-8 hours of sleep on average.  - Hearing: normal hearing right ear.  - Vision: vision problems.  - Dental: brushes teeth three times daily.    /GYN Health:  - Follows with GYN: no.   - Menopause: perimenopausal.   - Last menstrual cycle: 2/18/2025.   - History of STDs: no  - Contraception: tubal ligation.      Advanced Care Planning:  - Has an advanced directive?: no    - Has a durable medical POA?: no      Review of Systems   Constitutional:  Negative for activity change, appetite change and fatigue.   HENT:  Negative for congestion, postnasal drip and rhinorrhea.    Respiratory:  Negative for cough, chest tightness, shortness of breath and wheezing.    Cardiovascular:  Negative for chest pain, palpitations and leg swelling.   Gastrointestinal:  Negative for abdominal pain, constipation, diarrhea, nausea and vomiting.   Genitourinary:  Negative for dysuria, vaginal bleeding and vaginal discharge.   Musculoskeletal:  Negative for arthralgias, back pain and myalgias.   Skin:  Negative for color change and rash.   Allergic/Immunologic: Positive for environmental allergies.   Neurological:  Negative for dizziness, light-headedness and headaches.     Current Outpatient Medications on File Prior to Visit   Medication Sig Dispense Refill    benzonatate (TESSALON) 200 MG capsule Take 1 capsule (200 mg total) by mouth 3 (three) times a day as needed for cough 20 capsule 0    ibuprofen (MOTRIN) 800 mg tablet Take 1 tablet (800 mg total) by mouth every 8 (eight) hours as needed for mild pain or moderate pain 30 tablet 0    promethazine-dextromethorphan (PHENERGAN-DM) 6.25-15 mg/5 mL oral syrup Take 5 mL by mouth daily at bedtime as needed for cough 118 mL 0    tirzepatide (Zepbound) 10 mg/0.5 mL auto-injector Inject 0.5 mL (10 mg total) under the skin once a week 2 mL 4     No current facility-administered medications on file prior to visit.        Objective   /66 (BP Location: Left arm,  Patient Position: Sitting, Cuff Size: Standard)   Pulse 83   Temp 98 °F (36.7 °C)   Resp 18   Wt 83 kg (183 lb)   LMP 02/18/2025   SpO2 98%   BMI 29.55 kg/m²     Physical Exam  Vitals and nursing note reviewed.   Constitutional:       General: She is not in acute distress.     Appearance: Normal appearance. She is well-developed. She is not ill-appearing.   HENT:      Head: Normocephalic and atraumatic.      Right Ear: External ear normal.      Left Ear: External ear normal.      Nose: Nose normal.   Eyes:      Conjunctiva/sclera: Conjunctivae normal.   Cardiovascular:      Rate and Rhythm: Normal rate and regular rhythm.      Pulses: Normal pulses.      Heart sounds: Normal heart sounds. No murmur heard.  Pulmonary:      Effort: Pulmonary effort is normal. No respiratory distress.      Breath sounds: Normal breath sounds.   Abdominal:      Palpations: Abdomen is soft.      Tenderness: There is no abdominal tenderness.   Musculoskeletal:         General: No swelling.      Cervical back: Neck supple.      Right lower leg: No edema.      Left lower leg: No edema.   Skin:     General: Skin is warm and dry.      Capillary Refill: Capillary refill takes less than 2 seconds.   Neurological:      Mental Status: She is alert.   Psychiatric:         Mood and Affect: Mood normal.

## 2025-03-25 NOTE — PATIENT INSTRUCTIONS
"Patient Education     Routine physical for adults   The Basics   Written by the doctors and editors at [facility]   What is a physical? -- A physical is a routine visit, or \"check-up,\" with your doctor. You might also hear it called a \"wellness visit\" or \"preventive visit.\"  During each visit, the doctor will:   Ask about your physical and mental health   Ask about your habits, behaviors, and lifestyle   Do an exam   Give you vaccines if needed   Talk to you about any medicines you take   Give advice about your health   Answer your questions  Getting regular check-ups is an important part of taking care of your health. It can help your doctor find and treat any problems you have. But it's also important for preventing health problems.  A routine physical is different from a \"sick visit.\" A sick visit is when you see a doctor because of a health concern or problem. Since physicals are scheduled ahead of time, you can think about what you want to ask the doctor.  How often should I get a physical? -- It depends on your age and health. In general, for people age 21 years and older:   If you are younger than 50 years, you might be able to get a physical every 3 years.   If you are 50 years or older, your doctor might recommend a physical every year.  If you have an ongoing health condition, like diabetes or high blood pressure, your doctor will probably want to see you more often.  What happens during a physical? -- In general, each visit will include:   Physical exam - The doctor or nurse will check your height, weight, heart rate, and blood pressure. They will also look at your eyes and ears. They will ask about how you are feeling and whether you have any symptoms that bother you.   Medicines - It's a good idea to bring a list of all the medicines you take to each doctor visit. Your doctor will talk to you about your medicines and answer any questions. Tell them if you are having any side effects that bother you. You " "should also tell them if you are having trouble paying for any of your medicines.   Habits and behaviors - This includes:   Your diet   Your exercise habits   Whether you smoke, drink alcohol, or use drugs   Whether you are sexually active   Whether you feel safe at home  Your doctor will talk to you about things you can do to improve your health and lower your risk of health problems. They will also offer help and support. For example, if you want to quit smoking, they can give you advice and might prescribe medicines. If you want to improve your diet or get more physical activity, they can help you with this, too.   Lab tests, if needed - The tests you get will depend on your age and situation. For example, your doctor might want to check your:   Cholesterol   Blood sugar   Iron level   Vaccines - The recommended vaccines will depend on your age, health, and what vaccines you already had. Vaccines are very important because they can prevent certain serious or deadly infections.   Discussion of screening - \"Screening\" means checking for diseases or other health problems before they cause symptoms. Your doctor can recommend screening based on your age, risk, and preferences. This might include tests to check for:   Cancer, such as breast, prostate, cervical, ovarian, colorectal, prostate, lung, or skin cancer   Sexually transmitted infections, such as chlamydia and gonorrhea   Mental health conditions like depression and anxiety  Your doctor will talk to you about the different types of screening tests. They can help you decide which screenings to have. They can also explain what the results might mean.   Answering questions - The physical is a good time to ask the doctor or nurse questions about your health. If needed, they can refer you to other doctors or specialists, too.  Adults older than 65 years often need other care, too. As you get older, your doctor will talk to you about:   How to prevent falling at " home   Hearing or vision tests   Memory testing   How to take your medicines safely   Making sure that you have the help and support you need at home  All topics are updated as new evidence becomes available and our peer review process is complete.  This topic retrieved from [facility] on: May 02, 2024.  Topic 083571 Version 1.0  Release: 32.4.3 - C32.122  © 2024 UpToDate, Inc. and/or its affiliates. All rights reserved.  Consumer Information Use and Disclaimer   Disclaimer: This generalized information is a limited summary of diagnosis, treatment, and/or medication information. It is not meant to be comprehensive and should be used as a tool to help the user understand and/or assess potential diagnostic and treatment options. It does NOT include all information about conditions, treatments, medications, side effects, or risks that may apply to a specific patient. It is not intended to be medical advice or a substitute for the medical advice, diagnosis, or treatment of a health care provider based on the health care provider's examination and assessment of a patient's specific and unique circumstances. Patients must speak with a health care provider for complete information about their health, medical questions, and treatment options, including any risks or benefits regarding use of medications. This information does not endorse any treatments or medications as safe, effective, or approved for treating a specific patient. UpToDate, Inc. and its affiliates disclaim any warranty or liability relating to this information or the use thereof.The use of this information is governed by the Terms of Use, available at https://www.w[employer] Nowuwer.com/en/know/clinical-effectiveness-terms. 2024© UpToDate, Inc. and its affiliates and/or licensors. All rights reserved.  Copyright   © 2024 Up[employer]Date, Inc. and/or its affiliates. All rights reserved.

## 2025-04-25 ENCOUNTER — OFFICE VISIT (OUTPATIENT)
Age: 45
End: 2025-04-25
Payer: COMMERCIAL

## 2025-04-25 VITALS
SYSTOLIC BLOOD PRESSURE: 110 MMHG | DIASTOLIC BLOOD PRESSURE: 70 MMHG | TEMPERATURE: 98.4 F | WEIGHT: 180.4 LBS | BODY MASS INDEX: 28.99 KG/M2 | HEIGHT: 66 IN | HEART RATE: 90 BPM

## 2025-04-25 DIAGNOSIS — R73.03 PRE-DIABETES: ICD-10-CM

## 2025-04-25 DIAGNOSIS — E66.811 CLASS 1 OBESITY WITHOUT SERIOUS COMORBIDITY WITH BODY MASS INDEX (BMI) OF 34.0 TO 34.9 IN ADULT, UNSPECIFIED OBESITY TYPE: ICD-10-CM

## 2025-04-25 PROCEDURE — 99213 OFFICE O/P EST LOW 20 MIN: CPT | Performed by: PHYSICIAN ASSISTANT

## 2025-04-25 RX ORDER — TIRZEPATIDE 10 MG/.5ML
10 INJECTION, SOLUTION SUBCUTANEOUS WEEKLY
Qty: 2 ML | Refills: 4 | Status: SHIPPED | OUTPATIENT
Start: 2025-04-25

## 2025-04-25 NOTE — PROGRESS NOTES
=-Assessment/Plan:  Shayla was seen today for follow-up.    Diagnoses and all orders for this visit:    Class 1 obesity without serious comorbidity with body mass index (BMI) of 34.0 to 34.9 in adult, unspecified obesity type  -     tirzepatide (Zepbound) 10 mg/0.5 mL auto-injector; Inject 0.5 mL (10 mg total) under the skin once a week    Pre-diabetes  -     tirzepatide (Zepbound) 10 mg/0.5 mL auto-injector; Inject 0.5 mL (10 mg total) under the skin once a week         Started Zepbound 8/2024  Current dose: 10mg  Start weight: 219 lbs (8/2/24)  Last visit weight : 199 lbs BMI 32.23 (11/19/24)  Current weight: 180 lbs BMI 29/21 (4/25/25)  Change:  -39 lbs (-17.81% TBW)  Goal: 170-180 lbs    - Weight not at goal  - Patient is interested in Conservative Program  - Labs reviewed: As below.    General Recommendations:  Nutrition:  Eat breakfast daily.  Do not skip meals.     Food log (ie.) www.OneWire.com, sparkpeople.com, loseit.com, calorieking.com, etc.    Practice mindful eating.  Be sure to set aside time to eat, eat slowly, and savor your food.    Hydration:    At least 64oz of water daily.  No sugar sweetened beverages.  No juice (eat the fruit instead).    Exercise:  Studies have shown that the ideal exercise goal is somewhere between 150 to 300 minutes of moderate intensity exercise a week.  Start with exercising 10 minutes every other day and gradually increase physical activity with a goal of at least 150 minutes of moderate intensity exercise a week, divided over at least 3 days a week.  An example of this would be exercising 30 minutes a day, 5 days a week.  Resistance training can increase muscle mass and increase our resting metabolic rate.   FULL BODY resistance training is recommended 2-3 times a week.  Do not do this on consecutive days to allow for muscle recovery.    Aim for a bare minimum 5000 steps, even on days you do not exercise.    Monitoring:   Weigh yourself daily.  If this causes  undue stress, then just weigh yourself once a week.  Weigh yourself the same time of the day with the same amount of clothing on.  Preferably this should be done after waking up, before you eat, and with no clothing or minimal clothing on.    Calorie goal:  8939-3241 toby/day (Provided with meal plan to follow).    Return visit:    Calorie tracking/deficit - continue dietary plan per RD  Physical activity goals -  AOM  Contraception: tubal ligation  Continue zepbound 10mg  - Patient denies personal history of pancreatitis. Patient also denies personal and family history of thyroid cancer and multiple endocrine neoplasia type 2 (MEN 2 tumor).   RTC: 4 months           ______________________________________________________________________        Subjective:   Chief Complaint   Patient presents with    Follow-up     Mwm 4mth f/u       HPI: Shayla Casanova  is a 44 y.o. female with history of depression, prediabetes, and excess weight, here to pursue weight loss management.  Previous notes and records have been reviewed.    Previuosly prescribed Qsymia     Started Zepbound 8/2024  Current dose: 10mg  Start weight: 219 lbs (8/2/24)  Last visit weight : 199 lbs BMI 32.23 (11/19/24)  Current weight: 180 lbs BMI 29/21 (4/25/25)  Change:  -39 lbs (-17.81% TBW)  Goal: 170-180 lbs    Denies nausea, vomiting, constipation, diarrhea.  Noticing appetite suppression & early satiety.    Monthly menstrual cycles.      Hemoglobin A1c 5.7 (3/22/24)  TSH WNL  Wedding may 30,2025!     HPI  Wt Readings from Last 20 Encounters:   04/25/25 81.8 kg (180 lb 6.4 oz)   03/25/25 83 kg (183 lb)   01/01/25 88.5 kg (195 lb)   12/17/24 88.5 kg (195 lb)   12/17/24 88.5 kg (195 lb)   11/19/24 90.5 kg (199 lb 9.6 oz)   09/23/24 96.2 kg (212 lb)   08/02/24 99.3 kg (219 lb)   03/21/24 103 kg (226 lb)   06/07/23 102 kg (225 lb 12.8 oz)   12/14/22 102 kg (225 lb)   04/15/21 90.7 kg (200 lb)   10/11/16 101 kg (222 lb)   03/02/16 100 kg (221 lb 2 oz)    16 101 kg (222 lb 4 oz)   14 96.7 kg (213 lb 2 oz)   13 100 kg (220 lb 9.6 oz)     Weight History      Highest Weight: 225#  Lowest Weight: 168#     Past Attempts at Weight Loss: Medication and phenetermine , Wt loss practice. Currently on Zepbound     Food Recall  Breakfast: eggs 1-2, and a slice of toast, juice  Snack: skip  Lunch: Heaviest meal at lunch at [facility] cafeteria hot lunch. Chicken with Alexandra rice with vegetable + soda occasionally  Snack: skip  Dinner: Homemade: starch (rice, potatoes)+ protein (chicken and steak) + vegetables (broccoli)  Snack: Tastycake     Beverages: Water  Volume of Beverage Intake:  around 50 oz of water/day     Frequency of Dining out: 5 days a week( lunch at [facility])     Would the patient benefit from visit with  specialist?:  n/a     Physical Activity Intake  Activity: Walking  Frequency of Exercise: 30- 45 mins/ day 5 days a week  Physical Limitations to Exercise: n/a      Occupation: radiology tech with [employer]      Past Medical History:   Diagnosis Date    Headache(784.0)     Just on and off specially after i eat    Obesity         Past Surgical History:   Procedure Laterality Date     SECTION  3/18/2008    CHOLECYSTECTOMY  2009    TUBAL LIGATION  2022     The following portions of the patient's history were reviewed and updated as appropriate: allergies, current medications, past family history, past medical history, past social history, past surgical history, and problem list.    Review Of Systems:  Review of Systems   Constitutional:  Negative for fatigue and fever.   HENT:  Negative for sore throat, trouble swallowing and voice change.    Respiratory:  Negative for shortness of breath.    Cardiovascular:  Negative for chest pain.   Gastrointestinal:  Negative for abdominal pain, constipation, diarrhea, nausea and vomiting.   Endocrine: Negative for cold intolerance and heat intolerance.   Genitourinary:  Negative for difficulty urinating.  "  Musculoskeletal:  Negative for arthralgias and back pain.   Psychiatric/Behavioral:  Negative for suicidal ideas. The patient is not nervous/anxious.    All other systems reviewed and are negative.      Objective:  /70 (Patient Position: Sitting, Cuff Size: Standard)   Pulse 90   Temp 98.4 °F (36.9 °C) (Tympanic)   Ht 5' 5.9\" (1.674 m)   Wt 81.8 kg (180 lb 6.4 oz)   BMI 29.21 kg/m²   Physical Exam  Vitals and nursing note reviewed.   Constitutional:       General: She is not in acute distress.     Appearance: Normal appearance. She is obese. She is not ill-appearing, toxic-appearing or diaphoretic.   HENT:      Head: Normocephalic and atraumatic.   Eyes:      General:         Right eye: No discharge.         Left eye: No discharge.      Conjunctiva/sclera: Conjunctivae normal.   Pulmonary:      Effort: Pulmonary effort is normal. No respiratory distress.   Musculoskeletal:         General: Normal range of motion.      Cervical back: Normal range of motion. No rigidity.      Right lower leg: No edema.      Left lower leg: No edema.   Skin:     Coloration: Skin is not pale.      Findings: No erythema or rash.   Neurological:      General: No focal deficit present.      Mental Status: She is alert.   Psychiatric:         Mood and Affect: Mood normal.         Labs and Imaging  Recent labs and imaging have been personally reviewed.  Lab Results   Component Value Date    WBC 11.17 (H) 03/22/2024    HGB 12.8 03/22/2024    HCT 39.9 03/22/2024    MCV 96 03/22/2024     03/22/2024     Lab Results   Component Value Date    SODIUM 135 03/22/2024    K 4.0 03/22/2024     03/22/2024    CO2 24 03/22/2024    AGAP 8 03/22/2024    BUN 14 03/22/2024    CREATININE 0.68 03/22/2024    GLUC 97 12/28/2020    GLUF 108 (H) 03/22/2024    CALCIUM 8.6 03/22/2024    AST 18 03/22/2024    ALT 20 03/22/2024    ALKPHOS 73 03/22/2024    TP 6.8 03/22/2024    TBILI 0.28 03/22/2024    EGFR 105 03/22/2024     Lab Results "   Component Value Date    HGBA1C 5.7 (H) 03/22/2024     Lab Results   Component Value Date    FEM7HAQCZWLR 2.604 04/15/2024     Lab Results   Component Value Date    CHOLESTEROL 160 03/22/2024     Lab Results   Component Value Date    HDL 43 (L) 03/22/2024     Lab Results   Component Value Date    TRIG 150 (H) 03/22/2024     Lab Results   Component Value Date    LDLCALC 87 03/22/2024

## 2025-06-14 ENCOUNTER — APPOINTMENT (OUTPATIENT)
Dept: LAB | Facility: CLINIC | Age: 45
End: 2025-06-14
Attending: PHYSICIAN ASSISTANT
Payer: COMMERCIAL

## 2025-06-14 ENCOUNTER — APPOINTMENT (OUTPATIENT)
Dept: LAB | Facility: CLINIC | Age: 45
End: 2025-06-14
Attending: PREVENTIVE MEDICINE
Payer: COMMERCIAL

## 2025-06-14 DIAGNOSIS — Z11.59 NEED FOR HEPATITIS C SCREENING TEST: ICD-10-CM

## 2025-06-14 DIAGNOSIS — Z11.4 SCREENING FOR HIV (HUMAN IMMUNODEFICIENCY VIRUS): ICD-10-CM

## 2025-06-14 DIAGNOSIS — R73.03 PRE-DIABETES: ICD-10-CM

## 2025-06-14 DIAGNOSIS — Z00.8 HEALTH EXAMINATION IN POPULATION SURVEY: ICD-10-CM

## 2025-06-14 DIAGNOSIS — Z13.220 SCREENING, LIPID: ICD-10-CM

## 2025-06-14 DIAGNOSIS — Z13.0 SCREENING, IRON DEFICIENCY ANEMIA: ICD-10-CM

## 2025-06-14 DIAGNOSIS — Z13.228 SCREENING FOR METABOLIC DISORDER: ICD-10-CM

## 2025-06-14 LAB
ALBUMIN SERPL BCG-MCNC: 3.8 G/DL (ref 3.5–5)
ALP SERPL-CCNC: 71 U/L (ref 34–104)
ALT SERPL W P-5'-P-CCNC: 10 U/L (ref 7–52)
ANION GAP SERPL CALCULATED.3IONS-SCNC: 8 MMOL/L (ref 4–13)
AST SERPL W P-5'-P-CCNC: 17 U/L (ref 13–39)
BASOPHILS # BLD AUTO: 0.09 THOUSANDS/ÂΜL (ref 0–0.1)
BASOPHILS NFR BLD AUTO: 1 % (ref 0–1)
BILIRUB SERPL-MCNC: 0.7 MG/DL (ref 0.2–1)
BUN SERPL-MCNC: 13 MG/DL (ref 5–25)
CALCIUM SERPL-MCNC: 8.4 MG/DL (ref 8.4–10.2)
CHLORIDE SERPL-SCNC: 106 MMOL/L (ref 96–108)
CHOLEST SERPL-MCNC: 150 MG/DL (ref ?–200)
CO2 SERPL-SCNC: 24 MMOL/L (ref 21–32)
CREAT SERPL-MCNC: 0.53 MG/DL (ref 0.6–1.3)
EOSINOPHIL # BLD AUTO: 0.18 THOUSAND/ÂΜL (ref 0–0.61)
EOSINOPHIL NFR BLD AUTO: 2 % (ref 0–6)
ERYTHROCYTE [DISTWIDTH] IN BLOOD BY AUTOMATED COUNT: 13.9 % (ref 11.6–15.1)
EST. AVERAGE GLUCOSE BLD GHB EST-MCNC: 111 MG/DL
GFR SERPL CREATININE-BSD FRML MDRD: 114 ML/MIN/1.73SQ M
GLUCOSE P FAST SERPL-MCNC: 81 MG/DL (ref 65–99)
HBA1C MFR BLD: 5.5 %
HCT VFR BLD AUTO: 35.4 % (ref 34.8–46.1)
HDLC SERPL-MCNC: 48 MG/DL
HGB BLD-MCNC: 11.5 G/DL (ref 11.5–15.4)
IMM GRANULOCYTES # BLD AUTO: 0.03 THOUSAND/UL (ref 0–0.2)
IMM GRANULOCYTES NFR BLD AUTO: 0 % (ref 0–2)
LDLC SERPL CALC-MCNC: 89 MG/DL (ref 0–100)
LYMPHOCYTES # BLD AUTO: 2.27 THOUSANDS/ÂΜL (ref 0.6–4.47)
LYMPHOCYTES NFR BLD AUTO: 24 % (ref 14–44)
MCH RBC QN AUTO: 30.5 PG (ref 26.8–34.3)
MCHC RBC AUTO-ENTMCNC: 32.5 G/DL (ref 31.4–37.4)
MCV RBC AUTO: 94 FL (ref 82–98)
MONOCYTES # BLD AUTO: 0.71 THOUSAND/ÂΜL (ref 0.17–1.22)
MONOCYTES NFR BLD AUTO: 8 % (ref 4–12)
NEUTROPHILS # BLD AUTO: 6.17 THOUSANDS/ÂΜL (ref 1.85–7.62)
NEUTS SEG NFR BLD AUTO: 65 % (ref 43–75)
NONHDLC SERPL-MCNC: 102 MG/DL
NRBC BLD AUTO-RTO: 0 /100 WBCS
PLATELET # BLD AUTO: 352 THOUSANDS/UL (ref 149–390)
PMV BLD AUTO: 10.5 FL (ref 8.9–12.7)
POTASSIUM SERPL-SCNC: 3.9 MMOL/L (ref 3.5–5.3)
PROT SERPL-MCNC: 6.7 G/DL (ref 6.4–8.4)
RBC # BLD AUTO: 3.77 MILLION/UL (ref 3.81–5.12)
SODIUM SERPL-SCNC: 138 MMOL/L (ref 135–147)
TRIGL SERPL-MCNC: 67 MG/DL (ref ?–150)
WBC # BLD AUTO: 9.45 THOUSAND/UL (ref 4.31–10.16)

## 2025-06-14 PROCEDURE — 83036 HEMOGLOBIN GLYCOSYLATED A1C: CPT

## 2025-06-14 PROCEDURE — 36415 COLL VENOUS BLD VENIPUNCTURE: CPT

## 2025-06-14 PROCEDURE — 80053 COMPREHEN METABOLIC PANEL: CPT

## 2025-06-14 PROCEDURE — 85025 COMPLETE CBC W/AUTO DIFF WBC: CPT

## 2025-06-14 PROCEDURE — 86803 HEPATITIS C AB TEST: CPT

## 2025-06-14 PROCEDURE — 80061 LIPID PANEL: CPT

## 2025-06-14 PROCEDURE — 87389 HIV-1 AG W/HIV-1&-2 AB AG IA: CPT

## 2025-06-15 LAB
HCV AB SER QL: NORMAL
HIV 1+2 AB+HIV1 P24 AG SERPL QL IA: NORMAL

## 2025-07-16 DIAGNOSIS — E66.811 CLASS 1 OBESITY WITHOUT SERIOUS COMORBIDITY WITH BODY MASS INDEX (BMI) OF 34.0 TO 34.9 IN ADULT, UNSPECIFIED OBESITY TYPE: Primary | ICD-10-CM

## 2025-07-16 RX ORDER — TIRZEPATIDE 12.5 MG/.5ML
12.5 INJECTION, SOLUTION SUBCUTANEOUS WEEKLY
Qty: 2 ML | Refills: 1 | Status: SHIPPED | OUTPATIENT
Start: 2025-07-16 | End: 2025-09-10

## 2025-07-29 ENCOUNTER — TELEPHONE (OUTPATIENT)
Age: 45
End: 2025-07-29

## 2025-08-05 ENCOUNTER — OFFICE VISIT (OUTPATIENT)
Dept: OBGYN CLINIC | Facility: CLINIC | Age: 45
End: 2025-08-05
Payer: COMMERCIAL

## 2025-08-05 VITALS — DIASTOLIC BLOOD PRESSURE: 72 MMHG | SYSTOLIC BLOOD PRESSURE: 118 MMHG | WEIGHT: 181 LBS | BODY MASS INDEX: 29.3 KG/M2

## 2025-08-05 DIAGNOSIS — Z80.3 FAMILY HISTORY OF BREAST CANCER IN MOTHER: ICD-10-CM

## 2025-08-05 DIAGNOSIS — Z91.89 AT HIGH RISK FOR BREAST CANCER: ICD-10-CM

## 2025-08-05 DIAGNOSIS — N92.0 MENORRHAGIA WITH REGULAR CYCLE: ICD-10-CM

## 2025-08-05 DIAGNOSIS — Z11.3 SCREENING FOR STDS (SEXUALLY TRANSMITTED DISEASES): ICD-10-CM

## 2025-08-05 DIAGNOSIS — Z01.419 ENCOUNTER FOR GYNECOLOGICAL EXAMINATION (GENERAL) (ROUTINE) WITHOUT ABNORMAL FINDINGS: Primary | ICD-10-CM

## 2025-08-05 DIAGNOSIS — Z12.4 SCREENING FOR CERVICAL CANCER: ICD-10-CM

## 2025-08-05 DIAGNOSIS — Z01.419 ROUTINE GYNECOLOGICAL EXAMINATION: ICD-10-CM

## 2025-08-05 PROCEDURE — S0612 ANNUAL GYNECOLOGICAL EXAMINA: HCPCS

## 2025-08-05 PROCEDURE — 87591 N.GONORRHOEAE DNA AMP PROB: CPT

## 2025-08-05 PROCEDURE — 87491 CHLMYD TRACH DNA AMP PROBE: CPT

## 2025-08-05 PROCEDURE — G0145 SCR C/V CYTO,THINLAYER,RESCR: HCPCS

## 2025-08-05 PROCEDURE — G0476 HPV COMBO ASSAY CA SCREEN: HCPCS

## 2025-08-06 LAB
HPV HR 12 DNA CVX QL NAA+PROBE: NEGATIVE
HPV16 DNA CVX QL NAA+PROBE: NEGATIVE
HPV18 DNA CVX QL NAA+PROBE: NEGATIVE

## 2025-08-07 LAB
C TRACH DNA SPEC QL NAA+PROBE: NEGATIVE
N GONORRHOEA DNA SPEC QL NAA+PROBE: NEGATIVE

## 2025-08-08 LAB
LAB AP GYN PRIMARY INTERPRETATION: NORMAL
Lab: NORMAL